# Patient Record
Sex: FEMALE | Race: WHITE | Employment: OTHER | URBAN - METROPOLITAN AREA
[De-identification: names, ages, dates, MRNs, and addresses within clinical notes are randomized per-mention and may not be internally consistent; named-entity substitution may affect disease eponyms.]

---

## 2024-06-21 ENCOUNTER — HOSPITAL ENCOUNTER (INPATIENT)
Age: 74
LOS: 3 days | Discharge: HOME | DRG: 034 | End: 2024-06-25
Attending: EMERGENCY MEDICINE | Admitting: INTERNAL MEDICINE
Payer: MEDICARE

## 2024-06-21 ENCOUNTER — APPOINTMENT (OUTPATIENT)
Dept: CT IMAGING | Age: 74
DRG: 034 | End: 2024-06-21
Payer: MEDICARE

## 2024-06-21 DIAGNOSIS — I63.9 ACUTE CVA (CEREBROVASCULAR ACCIDENT) (HCC): ICD-10-CM

## 2024-06-21 DIAGNOSIS — G51.0 FACIAL PALSY: ICD-10-CM

## 2024-06-21 DIAGNOSIS — R29.898 RIGHT ARM WEAKNESS: Primary | ICD-10-CM

## 2024-06-21 PROBLEM — R29.90 STROKE-LIKE SYMPTOMS: Status: ACTIVE | Noted: 2024-06-21

## 2024-06-21 LAB
ALBUMIN SERPL-MCNC: 4 GM/DL (ref 3.4–5)
ALP BLD-CCNC: 70 IU/L (ref 40–129)
ALT SERPL-CCNC: 7 U/L (ref 10–40)
ANION GAP SERPL CALCULATED.3IONS-SCNC: 17 MMOL/L (ref 7–16)
AST SERPL-CCNC: 21 IU/L (ref 15–37)
BASOPHILS ABSOLUTE: 0.1 K/CU MM
BASOPHILS RELATIVE PERCENT: 0.4 % (ref 0–1)
BILIRUB SERPL-MCNC: 0.5 MG/DL (ref 0–1)
BUN SERPL-MCNC: 14 MG/DL (ref 6–23)
CALCIUM SERPL-MCNC: 9.5 MG/DL (ref 8.3–10.6)
CHLORIDE BLD-SCNC: 97 MMOL/L (ref 99–110)
CO2: 23 MMOL/L (ref 21–32)
CREAT SERPL-MCNC: 0.8 MG/DL (ref 0.6–1.1)
DIFFERENTIAL TYPE: ABNORMAL
EOSINOPHILS ABSOLUTE: 0.1 K/CU MM
EOSINOPHILS RELATIVE PERCENT: 1 % (ref 0–3)
GFR, ESTIMATED: 77 ML/MIN/1.73M2
GLUCOSE BLD-MCNC: 144 MG/DL (ref 70–99)
GLUCOSE BLD-MCNC: 148 MG/DL (ref 70–99)
GLUCOSE SERPL-MCNC: 141 MG/DL (ref 70–99)
HCT VFR BLD CALC: 47.5 % (ref 37–47)
HEMOGLOBIN: 15.4 GM/DL (ref 12.5–16)
IMMATURE NEUTROPHIL %: 0.2 % (ref 0–0.43)
INR BLD: 1 INDEX
LYMPHOCYTES ABSOLUTE: 3.5 K/CU MM
LYMPHOCYTES RELATIVE PERCENT: 26.1 % (ref 24–44)
MCH RBC QN AUTO: 28.7 PG (ref 27–31)
MCHC RBC AUTO-ENTMCNC: 32.4 % (ref 32–36)
MCV RBC AUTO: 88.6 FL (ref 78–100)
MONOCYTES ABSOLUTE: 1 K/CU MM
MONOCYTES RELATIVE PERCENT: 7.4 % (ref 0–4)
NEUTROPHILS ABSOLUTE: 8.6 K/CU MM
NEUTROPHILS RELATIVE PERCENT: 64.9 % (ref 36–66)
NUCLEATED RBC %: 0 %
PDW BLD-RTO: 19.9 % (ref 11.7–14.9)
PLATELET # BLD: 256 K/CU MM (ref 140–440)
PMV BLD AUTO: 10.8 FL (ref 7.5–11.1)
POTASSIUM SERPL-SCNC: 3.4 MMOL/L (ref 3.5–5.1)
PROTHROMBIN TIME: 13.5 SECONDS (ref 11.7–14.5)
RBC # BLD: 5.36 M/CU MM (ref 4.2–5.4)
SODIUM BLD-SCNC: 137 MMOL/L (ref 135–145)
TOTAL IMMATURE NEUTOROPHIL: 0.03 K/CU MM
TOTAL NUCLEATED RBC: 0 K/CU MM
TOTAL PROTEIN: 7.3 GM/DL (ref 6.4–8.2)
TROPONIN, HIGH SENSITIVITY: 13 NG/L (ref 0–14)
WBC # BLD: 13.2 K/CU MM (ref 4–10.5)

## 2024-06-21 PROCEDURE — 82962 GLUCOSE BLOOD TEST: CPT

## 2024-06-21 PROCEDURE — 93005 ELECTROCARDIOGRAM TRACING: CPT | Performed by: NURSE PRACTITIONER

## 2024-06-21 PROCEDURE — 85025 COMPLETE CBC W/AUTO DIFF WBC: CPT

## 2024-06-21 PROCEDURE — 70450 CT HEAD/BRAIN W/O DYE: CPT

## 2024-06-21 PROCEDURE — G0378 HOSPITAL OBSERVATION PER HR: HCPCS

## 2024-06-21 PROCEDURE — 70498 CT ANGIOGRAPHY NECK: CPT

## 2024-06-21 PROCEDURE — 80053 COMPREHEN METABOLIC PANEL: CPT

## 2024-06-21 PROCEDURE — 99285 EMERGENCY DEPT VISIT HI MDM: CPT

## 2024-06-21 PROCEDURE — 85610 PROTHROMBIN TIME: CPT

## 2024-06-21 PROCEDURE — 6360000004 HC RX CONTRAST MEDICATION: Performed by: EMERGENCY MEDICINE

## 2024-06-21 PROCEDURE — 84484 ASSAY OF TROPONIN QUANT: CPT

## 2024-06-21 PROCEDURE — 6370000000 HC RX 637 (ALT 250 FOR IP): Performed by: EMERGENCY MEDICINE

## 2024-06-21 RX ORDER — ONDANSETRON 2 MG/ML
4 INJECTION INTRAMUSCULAR; INTRAVENOUS EVERY 6 HOURS PRN
Status: DISCONTINUED | OUTPATIENT
Start: 2024-06-21 | End: 2024-06-24 | Stop reason: SDUPTHER

## 2024-06-21 RX ORDER — ATORVASTATIN CALCIUM 40 MG/1
80 TABLET, FILM COATED ORAL NIGHTLY
Status: DISCONTINUED | OUTPATIENT
Start: 2024-06-21 | End: 2024-06-22

## 2024-06-21 RX ORDER — ASPIRIN 81 MG/1
324 TABLET, CHEWABLE ORAL ONCE
Status: COMPLETED | OUTPATIENT
Start: 2024-06-21 | End: 2024-06-21

## 2024-06-21 RX ORDER — LABETALOL HYDROCHLORIDE 5 MG/ML
10 INJECTION INTRAVENOUS EVERY 10 MIN PRN
Status: DISCONTINUED | OUTPATIENT
Start: 2024-06-21 | End: 2024-06-23

## 2024-06-21 RX ORDER — OMEPRAZOLE 20 MG/1
20 CAPSULE, DELAYED RELEASE ORAL DAILY
COMMUNITY

## 2024-06-21 RX ORDER — MECLIZINE HCL 25MG 25 MG/1
25 TABLET, CHEWABLE ORAL AS NEEDED
COMMUNITY

## 2024-06-21 RX ORDER — LISINOPRIL AND HYDROCHLOROTHIAZIDE 20; 12.5 MG/1; MG/1
1 TABLET ORAL DAILY
Status: ON HOLD | COMMUNITY
End: 2024-06-25 | Stop reason: HOSPADM

## 2024-06-21 RX ORDER — ROSUVASTATIN CALCIUM 20 MG/1
20 TABLET, COATED ORAL NIGHTLY
COMMUNITY

## 2024-06-21 RX ORDER — LEVOTHYROXINE SODIUM 0.03 MG/1
25 TABLET ORAL DAILY
COMMUNITY

## 2024-06-21 RX ORDER — AMLODIPINE BESYLATE 10 MG/1
10 TABLET ORAL DAILY
COMMUNITY

## 2024-06-21 RX ORDER — ONDANSETRON 4 MG/1
4 TABLET, ORALLY DISINTEGRATING ORAL EVERY 8 HOURS PRN
Status: DISCONTINUED | OUTPATIENT
Start: 2024-06-21 | End: 2024-06-24 | Stop reason: SDUPTHER

## 2024-06-21 RX ORDER — ENOXAPARIN SODIUM 100 MG/ML
40 INJECTION SUBCUTANEOUS DAILY
Status: DISCONTINUED | OUTPATIENT
Start: 2024-06-22 | End: 2024-06-25 | Stop reason: HOSPADM

## 2024-06-21 RX ORDER — METOPROLOL TARTRATE 50 MG/1
50 TABLET, FILM COATED ORAL 2 TIMES DAILY
Status: ON HOLD | COMMUNITY
End: 2024-06-25 | Stop reason: HOSPADM

## 2024-06-21 RX ORDER — ASPIRIN 81 MG/1
81 TABLET, CHEWABLE ORAL DAILY
Status: DISCONTINUED | OUTPATIENT
Start: 2024-06-22 | End: 2024-06-25 | Stop reason: HOSPADM

## 2024-06-21 RX ORDER — ROPINIROLE 1 MG/1
1 TABLET, FILM COATED ORAL 3 TIMES DAILY
COMMUNITY

## 2024-06-21 RX ORDER — ASPIRIN 300 MG/1
300 SUPPOSITORY RECTAL DAILY
Status: DISCONTINUED | OUTPATIENT
Start: 2024-06-22 | End: 2024-06-21

## 2024-06-21 RX ADMIN — IOPAMIDOL 75 ML: 755 INJECTION, SOLUTION INTRAVENOUS at 20:56

## 2024-06-21 RX ADMIN — ASPIRIN 324 MG: 81 TABLET, CHEWABLE ORAL at 21:44

## 2024-06-21 ASSESSMENT — PAIN SCALES - GENERAL
PAINLEVEL_OUTOF10: 0
PAINLEVEL_OUTOF10: 0

## 2024-06-21 ASSESSMENT — PAIN - FUNCTIONAL ASSESSMENT
PAIN_FUNCTIONAL_ASSESSMENT: 0-10
PAIN_FUNCTIONAL_ASSESSMENT: NONE - DENIES PAIN

## 2024-06-22 ENCOUNTER — APPOINTMENT (OUTPATIENT)
Dept: MRI IMAGING | Age: 74
DRG: 034 | End: 2024-06-22
Payer: MEDICARE

## 2024-06-22 PROBLEM — I63.9 ACUTE CVA (CEREBROVASCULAR ACCIDENT) (HCC): Status: ACTIVE | Noted: 2024-06-22

## 2024-06-22 LAB
ANION GAP SERPL CALCULATED.3IONS-SCNC: 14 MMOL/L (ref 7–16)
BUN SERPL-MCNC: 11 MG/DL (ref 6–23)
CALCIUM SERPL-MCNC: 9 MG/DL (ref 8.3–10.6)
CHLORIDE BLD-SCNC: 102 MMOL/L (ref 99–110)
CHOLEST SERPL-MCNC: 108 MG/DL
CO2: 26 MMOL/L (ref 21–32)
CREAT SERPL-MCNC: 0.6 MG/DL (ref 0.6–1.1)
ESTIMATED AVERAGE GLUCOSE: 131 MG/DL
GFR, ESTIMATED: >90 ML/MIN/1.73M2
GLUCOSE BLD-MCNC: 123 MG/DL (ref 70–99)
GLUCOSE SERPL-MCNC: 103 MG/DL (ref 70–99)
HBA1C MFR BLD: 6.2 % (ref 4.2–6.3)
HCT VFR BLD CALC: 43.9 % (ref 37–47)
HDLC SERPL-MCNC: 31 MG/DL
HEMOGLOBIN: 14 GM/DL (ref 12.5–16)
LDLC SERPL CALC-MCNC: 44 MG/DL
MAGNESIUM: 2.1 MG/DL (ref 1.8–2.4)
MCH RBC QN AUTO: 28.2 PG (ref 27–31)
MCHC RBC AUTO-ENTMCNC: 31.9 % (ref 32–36)
MCV RBC AUTO: 88.5 FL (ref 78–100)
PDW BLD-RTO: 19.4 % (ref 11.7–14.9)
PLATELET # BLD: 231 K/CU MM (ref 140–440)
PMV BLD AUTO: 11 FL (ref 7.5–11.1)
POTASSIUM SERPL-SCNC: 3.3 MMOL/L (ref 3.5–5.1)
RBC # BLD: 4.96 M/CU MM (ref 4.2–5.4)
SODIUM BLD-SCNC: 142 MMOL/L (ref 135–145)
TRIGL SERPL-MCNC: 165 MG/DL
WBC # BLD: 7.1 K/CU MM (ref 4–10.5)

## 2024-06-22 PROCEDURE — 6360000002 HC RX W HCPCS: Performed by: STUDENT IN AN ORGANIZED HEALTH CARE EDUCATION/TRAINING PROGRAM

## 2024-06-22 PROCEDURE — 36415 COLL VENOUS BLD VENIPUNCTURE: CPT

## 2024-06-22 PROCEDURE — 94761 N-INVAS EAR/PLS OXIMETRY MLT: CPT

## 2024-06-22 PROCEDURE — 82962 GLUCOSE BLOOD TEST: CPT

## 2024-06-22 PROCEDURE — 1200000000 HC SEMI PRIVATE

## 2024-06-22 PROCEDURE — 70551 MRI BRAIN STEM W/O DYE: CPT

## 2024-06-22 PROCEDURE — 6370000000 HC RX 637 (ALT 250 FOR IP): Performed by: NURSE PRACTITIONER

## 2024-06-22 PROCEDURE — G0378 HOSPITAL OBSERVATION PER HR: HCPCS

## 2024-06-22 PROCEDURE — 80061 LIPID PANEL: CPT

## 2024-06-22 PROCEDURE — 93005 ELECTROCARDIOGRAM TRACING: CPT | Performed by: STUDENT IN AN ORGANIZED HEALTH CARE EDUCATION/TRAINING PROGRAM

## 2024-06-22 PROCEDURE — 83735 ASSAY OF MAGNESIUM: CPT

## 2024-06-22 PROCEDURE — 80048 BASIC METABOLIC PNL TOTAL CA: CPT

## 2024-06-22 PROCEDURE — 85027 COMPLETE CBC AUTOMATED: CPT

## 2024-06-22 PROCEDURE — 97161 PT EVAL LOW COMPLEX 20 MIN: CPT

## 2024-06-22 PROCEDURE — 83036 HEMOGLOBIN GLYCOSYLATED A1C: CPT

## 2024-06-22 PROCEDURE — 6370000000 HC RX 637 (ALT 250 FOR IP): Performed by: STUDENT IN AN ORGANIZED HEALTH CARE EDUCATION/TRAINING PROGRAM

## 2024-06-22 PROCEDURE — 96372 THER/PROPH/DIAG INJ SC/IM: CPT

## 2024-06-22 PROCEDURE — 99223 1ST HOSP IP/OBS HIGH 75: CPT | Performed by: STUDENT IN AN ORGANIZED HEALTH CARE EDUCATION/TRAINING PROGRAM

## 2024-06-22 PROCEDURE — 97165 OT EVAL LOW COMPLEX 30 MIN: CPT

## 2024-06-22 RX ORDER — LEVOTHYROXINE SODIUM 0.03 MG/1
25 TABLET ORAL DAILY
Status: DISCONTINUED | OUTPATIENT
Start: 2024-06-22 | End: 2024-06-25 | Stop reason: HOSPADM

## 2024-06-22 RX ORDER — LANOLIN ALCOHOL/MO/W.PET/CERES
3 CREAM (GRAM) TOPICAL NIGHTLY PRN
Status: DISCONTINUED | OUTPATIENT
Start: 2024-06-22 | End: 2024-06-25 | Stop reason: HOSPADM

## 2024-06-22 RX ORDER — POLYETHYLENE GLYCOL 3350 17 G/17G
17 POWDER, FOR SOLUTION ORAL DAILY
Status: DISCONTINUED | OUTPATIENT
Start: 2024-06-22 | End: 2024-06-25 | Stop reason: HOSPADM

## 2024-06-22 RX ORDER — HYDROXYZINE 50 MG/1
25 TABLET, FILM COATED ORAL ONCE
Status: COMPLETED | OUTPATIENT
Start: 2024-06-22 | End: 2024-06-22

## 2024-06-22 RX ORDER — LISINOPRIL AND HYDROCHLOROTHIAZIDE 20; 12.5 MG/1; MG/1
1 TABLET ORAL DAILY
Status: DISCONTINUED | OUTPATIENT
Start: 2024-06-22 | End: 2024-06-25 | Stop reason: HOSPADM

## 2024-06-22 RX ORDER — SENNA AND DOCUSATE SODIUM 50; 8.6 MG/1; MG/1
2 TABLET, FILM COATED ORAL DAILY PRN
Status: DISCONTINUED | OUTPATIENT
Start: 2024-06-22 | End: 2024-06-25 | Stop reason: HOSPADM

## 2024-06-22 RX ORDER — METOPROLOL TARTRATE 50 MG/1
50 TABLET, FILM COATED ORAL 2 TIMES DAILY
Status: DISCONTINUED | OUTPATIENT
Start: 2024-06-22 | End: 2024-06-25 | Stop reason: HOSPADM

## 2024-06-22 RX ORDER — ROPINIROLE 1 MG/1
1 TABLET, FILM COATED ORAL 3 TIMES DAILY
Status: DISCONTINUED | OUTPATIENT
Start: 2024-06-22 | End: 2024-06-25 | Stop reason: HOSPADM

## 2024-06-22 RX ORDER — AMLODIPINE BESYLATE 10 MG/1
10 TABLET ORAL DAILY
Status: DISCONTINUED | OUTPATIENT
Start: 2024-06-22 | End: 2024-06-25 | Stop reason: HOSPADM

## 2024-06-22 RX ORDER — CLOPIDOGREL BISULFATE 75 MG/1
300 TABLET ORAL ONCE
Status: COMPLETED | OUTPATIENT
Start: 2024-06-22 | End: 2024-06-22

## 2024-06-22 RX ORDER — PANTOPRAZOLE SODIUM 20 MG/1
20 TABLET, DELAYED RELEASE ORAL
Status: DISCONTINUED | OUTPATIENT
Start: 2024-06-22 | End: 2024-06-25 | Stop reason: HOSPADM

## 2024-06-22 RX ORDER — ROSUVASTATIN CALCIUM 20 MG/1
20 TABLET, COATED ORAL NIGHTLY
Status: DISCONTINUED | OUTPATIENT
Start: 2024-06-22 | End: 2024-06-25 | Stop reason: HOSPADM

## 2024-06-22 RX ORDER — CLOPIDOGREL BISULFATE 75 MG/1
75 TABLET ORAL DAILY
Status: DISCONTINUED | OUTPATIENT
Start: 2024-06-23 | End: 2024-06-25 | Stop reason: HOSPADM

## 2024-06-22 RX ORDER — POTASSIUM CHLORIDE 20 MEQ/1
40 TABLET, EXTENDED RELEASE ORAL ONCE
Status: COMPLETED | OUTPATIENT
Start: 2024-06-22 | End: 2024-06-22

## 2024-06-22 RX ADMIN — LEVOTHYROXINE SODIUM 25 MCG: 25 TABLET ORAL at 06:12

## 2024-06-22 RX ADMIN — ROSUVASTATIN CALCIUM 20 MG: 20 TABLET, COATED ORAL at 22:10

## 2024-06-22 RX ADMIN — PANTOPRAZOLE SODIUM 20 MG: 20 TABLET, DELAYED RELEASE ORAL at 06:12

## 2024-06-22 RX ADMIN — ROPINIROLE HYDROCHLORIDE 1 MG: 1 TABLET, FILM COATED ORAL at 14:56

## 2024-06-22 RX ADMIN — ATORVASTATIN CALCIUM 80 MG: 40 TABLET, FILM COATED ORAL at 00:42

## 2024-06-22 RX ADMIN — CLOPIDOGREL BISULFATE 300 MG: 75 TABLET ORAL at 14:56

## 2024-06-22 RX ADMIN — SENNOSIDES AND DOCUSATE SODIUM 2 TABLET: 50; 8.6 TABLET ORAL at 17:19

## 2024-06-22 RX ADMIN — ASPIRIN 81 MG: 81 TABLET, CHEWABLE ORAL at 08:09

## 2024-06-22 RX ADMIN — ENOXAPARIN SODIUM 40 MG: 100 INJECTION SUBCUTANEOUS at 08:09

## 2024-06-22 RX ADMIN — HYDROXYZINE HYDROCHLORIDE 25 MG: 50 TABLET, FILM COATED ORAL at 22:10

## 2024-06-22 RX ADMIN — ROPINIROLE HYDROCHLORIDE 1 MG: 1 TABLET, FILM COATED ORAL at 22:10

## 2024-06-22 RX ADMIN — POTASSIUM CHLORIDE 40 MEQ: 1500 TABLET, EXTENDED RELEASE ORAL at 09:16

## 2024-06-22 RX ADMIN — ROPINIROLE HYDROCHLORIDE 1 MG: 1 TABLET, FILM COATED ORAL at 08:09

## 2024-06-22 NOTE — ED PROVIDER NOTES
HPI: In brief, for evaluation of right-sided weakness slurred speech and mild aphasia that started suddenly.  The patient reports that her last known well was around 4 PM.  No prior episodes of any similar.  Feels as though symptoms are rapidly improving.    Focused exam revealed very slight drift of the right upper extremity and some slight dysarthria and mild facial droop.  NIHSS of 3.  The patient had reassuring vitals here.  Appears to be neurovascularly otherwise intact.  The patient has a regular rhythm and normal rate.  Abdomen is soft nontender nondistended without rebound or guarding.  Good distal pulses.  No overlying skin changes.    ED course/MDM: Concern to be for acute stroke.  Stroke alert was activated.  The patient did not meet criteria for thrombolytics.  She did have abnormalities on CTA so I discussed the case with neurointerventional.  Plan will be to hospitalize him for further care based off conversation with neurology and neurointerventional, Dr. Garcia.  Discussed with HM team as well for hospitalization.  Patient was updated on plan of care and also agreeable.    Diagnostics: I independently interpreted the following study(s): CT head which shows no acute intracranial hemorrhage.  Consultants: I personally discussed the patient's management with Dr. Garcia who stated he will evaluate the patient in the morning with further recommendations to come.  Critical Care: I personally saw the patient and independently provided 33 minutes of non-concurrent critical care of the total shared critical care time provided excluding separately billable procedures.  Procedures: 12 lead EKG per my interpretation:  Normal Sinus Rhythm  Rate: 81  QTc is normal  There are nonspecific T wave changes.  There are are no ST concerning segment changes.    Prior EKG to compare with was not available    (All EKG's are interpreted by myself in the absence of a cardiologist)    I personally saw the patient and

## 2024-06-22 NOTE — CONSULTS
Neurology Service Consult Note  [facility]   Patient Name: Kiersten Bennett  : 1950        Subjective:   Reason for consult: Stroke like symptoms  74 y.o. female with history of HTN, HLD, hypothyroidism, GERD, RLS, right bell's palsy presenting to [facility] with complaints of right upper extremity and facial numbness. Stroke alert was called on arrival, initial NIH 0. Intervention was not recommended. CTA head and neck noted severe left ICA stenosis and severe left M2 focal stenosis. Patient was evaluated by Dr. Garcia Neuro intervention, will likely need angiogram and carotid stent, timing for this intervention pending MRI completion.    Chart was reviewed in detail, patient was seen and assessed.  Patient is resting in bed,  is at bedside.  She describes 2 separate events yesterday.  First fall at [facility] she was attempting to request creamer for coffee and while at the counter was unable to express what she wanted to say.  Has been tells me that she continues to have difficulty with speech following this event, felt symptoms persisted for 1 to 2 hours.  Upon returning home patient laid down to take a nap and upon waking noted that she was not able to control her right hand.  Symptoms persisted until shortly after she arrived at the ED and then have fully resolved.  Looking at timing this was approximately 2 hours.  Patient has history of Bell's palsy with mild residual right facial weakness.  She does not feel this is any different than her baseline.   confirms that as well.  Patient does take rosuvastatin at home without any missed doses.  She does not consistently take antiplatelet medication.  Patient does have a history of current tobacco use and cessation was recommended.  Patient lives both in [place] and [place], currently is here for an extended period of time.  Her primary care doctor is in [place].      History reviewed. No pertinent past  changes, inability to express self although knew what she wanted to say.  This lasted for about 2 hours.  Second event was right upper extremity numbness, weakness and sensation that she was unable to control her right hand.  This also lasted for about 2 hours.  Today patient feels back to baseline.  Does have mild residual right facial weakness secondary to history of Bell's palsy.  Otherwise no focal findings on exam.  Speech is clear, language is fluent.  Episodes of aphasia, right upper extremity numbness and weakness secondary to acute stroke superimposed on symptomatic left ICA stenosis  Neuroimaging as above  CT head with no acute findings but does note multiple sclerotic lesions throughout the visualized cranium.  Would consider CT chest, abdomen, pelvis to rule out any underlying malignancy.  Patient does have history of tobacco use, currently smokes  CTA head and neck with focal severe stenosis/occlusion of the left M2 segment MCA and severe left ICA stenosis  From neurology standpoint recommend DAPT, statin for secondary stroke prevention.  Will defer timing on when to initiate Plavix to Dr. Garcia with neurointervention pending possible upcoming angio. For now continue aspirin and Crestor  Will discontinue atorvastatin 80 mg and restart patient's home dose Crestor 20 mg  LDL 44, A1c 6.2  Would avoid hypotension to ensure patient is well-perfused.  SBP goal 120-140  Antihypertensives currently on hold per IM  Neurointervention following, possible angiogram upcoming  PT/OT/ST as recommended  Will continue to follow loosely pending clinical course        Thank you for allowing us to participate in the care of your patient.  If there are any questions regarding evaluation please feel free to contact us.     Mariaa Almaraz, MEGAN - CNP, 6/22/2024   Attending Addendum:    I have discussed this patient with the mid-level provider.  I have personally seen and examined the patient and reviewed the diagnostic testing

## 2024-06-22 NOTE — ED PROVIDER NOTES
[facility] EMERGENCY DEPARTMENT  EMERGENCY DEPARTMENT ENCOUNTER      Pt Name: Kiersten Bennett  MRN: [id number]  Birthdate 1950  Date of evaluation: 6/21/2024  Provider: MEGAN Badillo CNP  PCP: No primary care provider on file.  Note Started: 8:38 PM EDT 6/21/24    I am the Primary Clinician of Record.   I have seen and evaluated this patient with my supervising physician Simeon Chadwick MD.    CHIEF COMPLAINT       Chief Complaint   Patient presents with    Facial Droop    Extremity Weakness     Right arm       HISTORY OF PRESENT ILLNESS: 1 or more Elements     History from : Patient and Family  and sister    Limitations to history : none    Kiersten Bennett is a 74 y.o. female who presents to the emergency department with right arm weakness and facial droop.  She states that she was at [email or web address] around 4:00 to get her  some coffee.  She noticed while she was ordering the her speech was slurred.  She states that she also had a hard time gripping the coffee.  When she got home she tried to get a cigarette out and could not hold a cigarette.  She laid down with her  for a while.  Her sister got home around 6:00 PM and noticed that she had right sided facial drooping.  Last known well was just before 4 PM.  Patient denies previous history of stroke.  No chest pain, no shortness of breath, no vision disturbances.  No headache.    Nursing Notes were all reviewed and agreed with or any disagreements were addressed in the HPI.    REVIEW OF SYSTEMS :      Review of Systems   Constitutional:  Negative for fatigue and fever.   HENT:  Positive for drooling (Right-sided mouth per ). Negative for facial swelling and trouble swallowing.    Eyes:  Negative for photophobia and visual disturbance.   Respiratory:  Negative for chest tightness and shortness of breath.    Gastrointestinal:  Negative for abdominal pain, nausea and vomiting.

## 2024-06-22 NOTE — PROGRESS NOTES
Neurointervention    MRI reviewed which showed small embolic infarct in the left middle cerebral artery territory consistent with her known high-grade stenosis of the left internal carotid artery and left intracranial middle cerebral artery.    Discussed with patient angiogram and possible carotid artery stent.  Risk and benefits discussed.    She is agreeable to the procedure, we will do this Monday afternoon around 1 PM.

## 2024-06-22 NOTE — CONSULTS
[facility] ACUTE CARE PHYSICAL THERAPY EVALUATION  Kiersten Bennett, 1950, OBS 02/[id number], 6/22/2024    History  [place]:  The primary encounter diagnosis was Right arm weakness. Diagnoses of Facial palsy and Acute CVA (cerebrovascular accident) (HCC) were also pertinent to this visit.  Patient  has no past medical history on file.  Patient  has no past surgical history on file.    Discharge Recommendation: no needs    Equipment: none    Subjective:    Patient states:  \"Do you know anything that will help restless leg syndrome?\"      Pain:  denies pain.      Communication with other providers:  Handoff to RN, OT    Restrictions: general precautions    Home Setup/Prior level of function  Social/Functional History  Lives With: Spouse (and sister)  Type of Home: House  Home Layout: One level, Laundry in basement, Able to Live on Main level with bedroom/bathroom  Home Access: Ramped entrance  Has the patient had two or more falls in the past year or any fall with injury in the past year?: Yes  ADL Assistance: Independent  Homemaking Assistance: Independent  Ambulation Assistance: Independent  Transfer Assistance: Independent  Active : Yes    Examination of body systems (includes body structures/functions, activity/participation limitations):  Observation:  pt supine in bed with spouse present upon arrival and agreeable to therapy  Vision:  Wears glasses  Hearing:  WFL  Cardiopulmonary:  no O2 needs  Cognition: WFL, see OT/SLP note for further evaluation.    Musculoskeletal  ROM R/L:  WFL.    Strength R/L:  5/5, no impairment in function and endurance.    Neuro:  WFL, pt back to baseline      Mobility:  Rolling L/R:  independent  Supine <-> sit:  independent  Transfers: pt completed STS to/from EOB independently  Sitting balance:  good.    Standing balance:  good.    Gait: pt ambulated 200' without a device SBA with good tran and no LOB. Cues provided for pathway    [facility] 6 Clicks Inpatient

## 2024-06-22 NOTE — PROGRESS NOTES
Neurointervention     Spoke with ED physician Dr Chadwick.    Patient with improved symptoms currently NIHSS 1 for r arm drift.  Not a candidate for acute intervention.      CTA reviewed left ICA severe stenosis and left M2 severe focal stenosis.Will likely need angiogram/carotid stent to evaluate and treat the carotid artery and evaluate mca stenosis.      Recommend MRI, MRI results will determine timing of angiogram/stent< BR>  Recommend aspirin for now, will  addiPlavix after mri if stroke is not large (which it is likely small based on exam) and in preparation for intervention.

## 2024-06-22 NOTE — PROGRESS NOTES
Occupational Therapy  [facility] ACUTE CARE OCCUPATIONAL THERAPY EVALUATION    Kiersten Bennett, 1950, OBS 02/[id number], 6/22/2024    Discharge Recommendation: home w/ assist prn      History:  [place]:  The primary encounter diagnosis was Right arm weakness. Diagnoses of Facial palsy and Acute CVA (cerebrovascular accident) (HCC) were also pertinent to this visit.  History reviewed. No pertinent past medical history.      Subjective:  Patient states: \"I feel fine now\"  Pain:  denies  Communication with other providers: co-eval w/ PT, handoff to RN  Restrictions: General Precautions, Fall Risk    Home Setup/Prior level of function:  Social/Functional History  Lives With: Spouse (and sister)  Type of Home: House  Home Layout: One level, Laundry in basement, Able to Live on Main level with bedroom/bathroom  Home Access: Ramped entrance  Has the patient had two or more falls in the past year or any fall with injury in the past year?: Yes  ADL Assistance: Independent  Homemaking Assistance: Independent  Ambulation Assistance: Independent  Transfer Assistance: Independent  Active : Yes     Examination:  Observation: Supine in bed upon arrival, agreeable to therapy eval.  Vision: reports macular degeneration  Hearing: WFL  Vitals: Stable vitals throughout session on room air      Body Systems and functions:  ROM: WFL   Strength: B UE grossly 4+/5 across all major joints   Sensation: WFL  Tone: Normal  Coordination: WFL  Perception: WNL      Cognitive and Psychosocial Functioning:  Overall cognitive status: alert and oriented, WFL  Affect: Normal       Functional Mobility:  Bed mobility:  IND  Sitting balance:  IND    Transfers: STS to/from EOB w/ IND  Standing balance:  IND w/o AD  Functional Mobility: ambulated functional household distance w/o AD w/ SBA, Vcs for pathway  Toilet/Shower Transfers: NT        Activities of Daily Living (ADLs):  Feeding: set up A  Grooming: supervision  UB bathing:  supervision  LB bathing: supervision  UB dressing: supervision  LB dressing: supervision  Toileting: supervision    *ADL determined per observation of functional mobility, balance, activity tolerance, cognition, or actual ADL performance.     AM-PAC 6 click short form for inpatient daily activity:   How much help from another person does the patient currently need... Unable  Dep A Lot  Max A A Lot   Mod A A Little  Min A A Little   CGA  SBA None   Mod I  Indep  Sup   1.  Putting on and taking off regular lower body clothing? [] 1    [] 2   [] 2   [] 3   [] 3   [x] 4      2. Bathing (including washing, rinsing, drying)? [] 1   [] 2   [] 2 [] 3 [] 3 [x] 4   3. Toileting, which includes using toilet, bedpan, or urinal? [] 1    [] 2   [] 2   [] 3   [] 3   [x] 4     4. Putting on and taking off regular upper body clothing? [] 1   [] 2   [] 2   [] 3   [] 3    [x] 4      5. Taking care of personal grooming such as brushing teeth? [] 1   [] 2    [] 2 [] 3    [] 3   [x] 4      6. Eating meals?   [] 1   [] 2   [] 2   [] 3   [] 3   [x] 4        Raw Score:  24     [24=0% impaired(CH), 23=1-19%(CI), 20-22=20-39%(CJ), 15-19=40-59%(CK), 10-14=60-79%(CL), 7-9=80-99%(CM), 6=100%(CN)]       Educated pt on role of OT, therapy POC and functional goals, progression w/ ADLs and transfers, importance of movement and OOB activity, d/c recommendations     Safety Measures: Gait belt used, Left in bed, Alarm in place  Recommendations for NURSING activity:  Up to chair for all 3 meals and up to bathroom for all toileting needs     Assessment:  Pt is a 74 y o F admitted d/t stroke like symptoms. Pt at baseline is IND for ADLs, IND for high level IADLs, and IND for functional transfers/mobility w/o AD. Pt currently presents at/near baseline w/ ADLs and mobility, no further acute  OT needs.     Complexity: Low  Prognosis: Good, no significant barriers to participation at this time.   Occupational Therapy Plan  Times Per Week: d/c       Time:

## 2024-06-22 NOTE — PLAN OF CARE
Problem: Discharge Planning  Goal: Discharge to home or other facility with appropriate resources  6/22/2024 1012 by Yenny Carvalho, RN  Outcome: Progressing  Flowsheets (Taken 6/22/2024 0017 by Dom Zhang, RN)  Discharge to home or other facility with appropriate resources:   Identify barriers to discharge with patient and caregiver   Arrange for needed discharge resources and transportation as appropriate   Identify discharge learning needs (meds, wound care, etc)   Refer to discharge planning if patient needs post-hospital services based on physician order or complex needs related to functional status, cognitive ability or social support system  6/21/2024 2337 by Dom Zhang, RN  Outcome: Progressing     Problem: Pain  Goal: Verbalizes/displays adequate comfort level or baseline comfort level  6/22/2024 1012 by Yenny Carvalho, RN  Outcome: Progressing  6/21/2024 2337 by Dom Zhang, RN  Outcome: Progressing

## 2024-06-22 NOTE — ED NOTES
ED TO INPATIENT SBAR HANDOFF    Patient Name: Kiersten Bennett   :  1950  74 y.o.   Preferred Name  Kiersten  Family/Caregiver Present yes   Restraints no   C-SSRS: Risk of Suicide: No Risk  Sitter no   Sepsis Risk Score        Situation  Chief Complaint   Patient presents with    Facial Droop    Extremity Weakness     Right arm     Brief Description of Patient's Condition: Patient presents to the ED from home with complaints of right arm weakness and facial droop. LKW was 6pm. Stroke alert called on patient. Patient able to ambulate to the bathroom without assistance.   Mental Status: oriented and alert  Arrived from: home    Imaging:   CT HEAD WO CONTRAST   Final Result   As above.      Electronically signed by Jorge Luis Kramer      CTA HEAD NECK W CONTRAST   Final Result      1.  Focal severe stenosis/occlusion of the left M2 segment with distal   reconstitution of the remainder of the M2 and M3 segments.   2.  Severe calcified and noncalcified plaque at the left bifurcation and   cervical ICA with up to 90% stenosis by NASCET criteria.   3.  Mild atherosclerotic calcification of the right bifurcation with up to 20%   stenosis by NASCET criteria.   4.  Scattered atherosclerotic calcification otherwise as described above.   5.  Severe emphysema within the imaged lung apices.      COMMUNICATION:  Communicated with: Dr. Chadwick on  2024 at 9:45 p.m.      Electronically signed by Martir De La Garza        Abnormal labs:   Abnormal Labs Reviewed   CBC WITH AUTO DIFFERENTIAL - Abnormal; Notable for the following components:       Result Value    WBC 13.2 (*)     Hematocrit 47.5 (*)     RDW 19.9 (*)     Monocytes % 7.4 (*)     All other components within normal limits   COMPREHENSIVE METABOLIC PANEL - Abnormal; Notable for the following components:    Potassium 3.4 (*)     Chloride 97 (*)     Anion Gap 17 (*)     Glucose 141 (*)     ALT 7 (*)     All other components within normal limits   POCT GLUCOSE - Abnormal;  Notable for the following components:    POC Glucose 144 (*)     All other components within normal limits   POCT GLUCOSE - Abnormal; Notable for the following components:    POC Glucose 148 (*)     All other components within normal limits        Background  History: History reviewed. No pertinent past medical history.    Assessment    Vitals:    Level of Consciousness: Alert (0)   Vitals:    06/21/24 2018 06/21/24 2102 06/21/24 2202   BP: (!) 153/61 138/62 (!) 127/57   Pulse: (!) 108 98 84   Resp:  16 14   Temp:  98.7 °F (37.1 °C)    TempSrc:  Oral    SpO2: 91% 95% 92%   Weight: 71.2 kg (157 lb)     Height: 1.6 m (5' 3\")       PO Status: Nothing by Mouth  O2 Flow Rate: O2 Device: None (Room air)    Cardiac Rhythm: NS  Last documented pain medication administered: none  NIH Score: NIH NIH Stroke Scale  Interval: Reassessment  Level of Consciousness (1a): Alert  LOC Questions (1b): Answers both correctly  LOC Commands (1c): Performs both tasks correctly  Best Gaze (2): Normal  Visual (3): No visual loss  Facial Palsy (4): (!) Minor paralysis  Motor Arm, Left (5a): No drift  Motor Arm, Right (5b): No drift  Motor Leg, Left (6a): No drift  Motor Leg, Right (6b): No drift  Limb Ataxia (7): Absent  Sensory (8): Normal  Best Language (9): No aphasia  Dysarthria (10): Normal  Extinction and Inattention (11): No abnormality  Total: 1   Active LDA's:   Peripheral IV 06/21/24 Left Antecubital (Active)       Pertinent or High Risk Medications/Drips: no   If Yes, please provide details: none  Blood Product Administration: no  If Yes, please provide details: none    Recommendation    Incomplete orders: Admission orders   Additional Comments: none   If any further questions, please call Sending RN at [phone]    Electronically signed by: Electronically signed by Tanja Gonzalez RN on 6/21/2024 at 10:05 PM

## 2024-06-22 NOTE — H&P
History and Physical      Name:  Kiersten Bennett /Age/Sex: 1950  (74 y.o. female)   MRN & CSN:  [id number] & [id number] Encounter Date/Time:2024  10:10 PM EDT   Location:  OBS  PCP: No primary care provider on file.       Assessment and Plan:   Kiersten Bennett is a 74 y.o. female with hypertension, hyperlipidemia, hypothyroidism, GERD, RLS, history of right-sided Bell's palsy presented from home with right upper extremity and facial numbness    Strokelike symptoms  LKN 1600 NIH on arrival 0 CT brain and CTA head and neck reviewed no acute hemorrhage or mass effect, left ICA 90% stenosis, multifocal stenosis left M2 and M3  Aspirin load, aspirin Lipitor  NIHSS and neurochecks per stroke protocol, MRI brain in a.m.  Discussed with vascular neurology, plan for intervention depending on MRI brain  PT OT evaluation    Hypertension  Hold all home medications due to permissive hypertension    Hypothyroidism  Continue home Synthroid    GERD  Continue home Protonix    RLS  Continue home Requip    Tobacco use  Refusing NRT, counseled on cessation    Observation MedSurg telemetry  Full code    Disposition:     Current Living situation: Home  Expected Disposition: Home  Estimated D/C: 2-3 days    Diet Diet NPO   DVT Prophylaxis [x] Lovenox, []  Heparin, [] SCDs, [] Ambulation,  [] Eliquis, [] Xarelto, [] Coumadin   Code Status Full Code   Surrogate Decision Maker/ KYLIE Narayan     Personally reviewed Lab Studies and Imaging   EKG interpreted personally and results NSR 81/min no acute ST-T wave abnormalities  Discussed management of the case with ER provider who recommended admission due to stroke rule out      History from:     Patient     History of Present Illness:     Chief Complaint   Patient presents with   • Facial Droop   • Extremity Weakness     Right arm     Kiersten Bennett is a 74 y.o. female with hypertension, hyperlipidemia, hypothyroidism, GERD, RLS, history of right-sided Bell's palsy  (ANTIVERT) 25 MG CHEW Take 1 tablet by mouth as needed (1 time daily)   Yes Provider, Historical, MD       Physical Exam:      General: NAD  Eyes: No scleral icterus or jaundice  ENT: neck supple  Cardiovascular: Regular rate.  Respiratory: Clear to auscultation  Gastrointestinal: Soft, non tender  Genitourinary: no suprapubic tenderness  Musculoskeletal: No edema  Skin: warm, dry  Neuro: Alert oriented x 3, mild right-sided facial droop-chronic per patient, NIH 0  Psych: Mood appropriate.     Past Medical History:     PMHx hypertension hyperlipidemia hypothyroidism GERD  PSHX: No pertinent surgical history  Allergies: No Known Allergies  Fam HX: Hypertension, CAD  Soc HX:   Social History     Socioeconomic History   • Marital status:      Spouse name: None   • Number of children: None   • Years of education: None   • Highest education level: None   Tobacco Use   • Smoking status: Every Day     Current packs/day: 2.00     Types: Cigarettes   • Smokeless tobacco: Never   Substance and Sexual Activity   • Alcohol use: Never   • Drug use: Never     Social Determinants of Health     Food Insecurity: No Food Insecurity (6/22/2024)    Hunger Vital Sign    • Worried About Running Out of Food in the Last Year: Never true    • Ran Out of Food in the Last Year: Never true   Transportation Needs: No Transportation Needs (6/22/2024)    PRAPARE - Transportation    • Lack of Transportation (Medical): No    • Lack of Transportation (Non-Medical): No   Housing Stability: Low Risk  (6/22/2024)    Housing Stability Vital Sign    • Unable to Pay for Housing in the Last Year: No    • Number of Places Lived in the Last Year: 1    • Unstable Housing in the Last Year: No       Medications:   Medications:   • [Held by provider] amLODIPine  10 mg Oral Daily   • levothyroxine  25 mcg Oral Daily   • [Held by provider] lisinopril-hydroCHLOROthiazide  1 tablet Oral Daily   • [Held by provider] metoprolol tartrate  50 mg Oral BID   •

## 2024-06-22 NOTE — PROGRESS NOTES
left M2 segment with distal reconstitution of the remainder of the M2 and M3 segments. 2.  Severe calcified and noncalcified plaque at the left bifurcation and cervical ICA with up to 90% stenosis by NASCET criteria. 3.  Mild atherosclerotic calcification of the right bifurcation with up to 20% stenosis by NASCET criteria. 4.  Scattered atherosclerotic calcification otherwise as described above. 5.  Severe emphysema within the imaged lung apices. COMMUNICATION:  Communicated with: Dr. Chadwick on  6/21/2024 at 9:45 p.m. Electronically signed by Martir De La Garza    CT HEAD WO CONTRAST    Result Date: 6/21/2024  Head CT INDICATION: Stroke TECHNIQUE: Multiple 2D axial images obtained through the brain without intravenous contrast.  Radiation dose reduction techniques were used for this study:  All CT scans performed at this facility use one or all of the following: Automated exposure control, adjustment of the mA and/or kVp according to patient's size, iterative reconstruction. COMPARISON: 2008 FINDINGS: No acute intracranial CT findings. Microvascular ischemia. No areas of abnormal attenuation are seen in the brain. There is no CT evidence of acute hemorrhage or infarction. The ventricles are normal in size. There are no extra-axial fluid collections. No masses are seen. The sinuses are clear. MULTIFOCAL SCLEROTIC LESIONS THROUGHOUT THE VISUALIZED CRANIUM. CORRELATE FOR METASTATIC DISEASE.     As above. Electronically signed by Jorge Luis Kramer      Electronically signed by MEGAN CONKLIN CNP on 6/22/2024 at 2:09 PM

## 2024-06-22 NOTE — PROGRESS NOTES
4 Eyes Skin Assessment     NAME:  Kiersten Bennett  YOB: 1950  MEDICAL RECORD NUMBER:  [id number]    The patient is being assessed for  Admission    I agree that at least one RN has performed a thorough Head to Toe Skin Assessment on the patient. ALL assessment sites listed below have been assessed.      Areas assessed by both nurses:    Head, Face, Ears, Shoulders, Back, Chest, Arms, Elbows, Hands, Sacrum. Buttock, Coccyx, Ischium, and Legs. Feet and Heels        Does the Patient have a Wound? No noted wound(s)       Quang Prevention initiated by RN: No  Wound Care Orders initiated by RN: No    Pressure Injury (Stage 3,4, Unstageable, DTI, NWPT, and Complex wounds) if present, place Wound referral order by RN under : No    New Ostomies, if present place, Ostomy referral order under : No     Nurse 1 eSignature: Electronically signed by Dom Zhang RN on 6/22/24 at 2:53 AM EDT    **SHARE this note so that the co-signing nurse can place an eSignature**    Nurse 2 eSignature: {Esignature:[id number]}

## 2024-06-23 LAB
BILIRUBIN, URINE: ABNORMAL MG/DL
BLOOD, URINE: NEGATIVE
CLARITY, UA: CLEAR
COLOR, UA: YELLOW
COMMENT UA: ABNORMAL
EKG ATRIAL RATE: 106 BPM
EKG ATRIAL RATE: 81 BPM
EKG DIAGNOSIS: NORMAL
EKG DIAGNOSIS: NORMAL
EKG P AXIS: 75 DEGREES
EKG P AXIS: 91 DEGREES
EKG P-R INTERVAL: 168 MS
EKG P-R INTERVAL: 180 MS
EKG Q-T INTERVAL: 356 MS
EKG Q-T INTERVAL: 420 MS
EKG QRS DURATION: 82 MS
EKG QRS DURATION: 86 MS
EKG QTC CALCULATION (BAZETT): 472 MS
EKG QTC CALCULATION (BAZETT): 487 MS
EKG R AXIS: -10 DEGREES
EKG R AXIS: -12 DEGREES
EKG T AXIS: 18 DEGREES
EKG T AXIS: 77 DEGREES
EKG VENTRICULAR RATE: 106 BPM
EKG VENTRICULAR RATE: 81 BPM
EPITHELIAL CELLS, UA: 3 /HPF
GLUCOSE URINE: NEGATIVE MG/DL
KETONES, URINE: ABNORMAL MG/DL
LEUKOCYTE ESTERASE, URINE: NEGATIVE
NITRITE URINE, QUANTITATIVE: NEGATIVE
PH, URINE: 5.5 (ref 5–8)
PROTEIN UA: 30 MG/DL
RBC URINE: 2 /HPF (ref 0–6)
SPECIFIC GRAVITY UA: >1.03 (ref 1–1.03)
UROBILINOGEN, URINE: 1 MG/DL (ref 0.2–1)
WBC UA: 10 /HPF (ref 0–5)

## 2024-06-23 PROCEDURE — 81001 URINALYSIS AUTO W/SCOPE: CPT

## 2024-06-23 PROCEDURE — 6370000000 HC RX 637 (ALT 250 FOR IP): Performed by: NURSE PRACTITIONER

## 2024-06-23 PROCEDURE — 6370000000 HC RX 637 (ALT 250 FOR IP): Performed by: STUDENT IN AN ORGANIZED HEALTH CARE EDUCATION/TRAINING PROGRAM

## 2024-06-23 PROCEDURE — 93010 ELECTROCARDIOGRAM REPORT: CPT | Performed by: INTERNAL MEDICINE

## 2024-06-23 PROCEDURE — 99233 SBSQ HOSP IP/OBS HIGH 50: CPT | Performed by: NURSE PRACTITIONER

## 2024-06-23 PROCEDURE — 1200000000 HC SEMI PRIVATE

## 2024-06-23 PROCEDURE — 94761 N-INVAS EAR/PLS OXIMETRY MLT: CPT

## 2024-06-23 PROCEDURE — 6360000002 HC RX W HCPCS: Performed by: STUDENT IN AN ORGANIZED HEALTH CARE EDUCATION/TRAINING PROGRAM

## 2024-06-23 RX ORDER — TRAZODONE HYDROCHLORIDE 50 MG/1
50 TABLET ORAL NIGHTLY PRN
Status: DISCONTINUED | OUTPATIENT
Start: 2024-06-23 | End: 2024-06-25 | Stop reason: HOSPADM

## 2024-06-23 RX ADMIN — PANTOPRAZOLE SODIUM 20 MG: 20 TABLET, DELAYED RELEASE ORAL at 06:49

## 2024-06-23 RX ADMIN — LEVOTHYROXINE SODIUM 25 MCG: 25 TABLET ORAL at 06:49

## 2024-06-23 RX ADMIN — ROPINIROLE HYDROCHLORIDE 1 MG: 1 TABLET, FILM COATED ORAL at 20:03

## 2024-06-23 RX ADMIN — ASPIRIN 81 MG: 81 TABLET, CHEWABLE ORAL at 07:58

## 2024-06-23 RX ADMIN — ROSUVASTATIN CALCIUM 20 MG: 20 TABLET, COATED ORAL at 20:03

## 2024-06-23 RX ADMIN — POLYETHYLENE GLYCOL (3350) 17 G: 17 POWDER, FOR SOLUTION ORAL at 07:58

## 2024-06-23 RX ADMIN — CLOPIDOGREL BISULFATE 75 MG: 75 TABLET ORAL at 07:58

## 2024-06-23 RX ADMIN — ENOXAPARIN SODIUM 40 MG: 100 INJECTION SUBCUTANEOUS at 07:57

## 2024-06-23 RX ADMIN — ROPINIROLE HYDROCHLORIDE 1 MG: 1 TABLET, FILM COATED ORAL at 07:58

## 2024-06-23 ASSESSMENT — PAIN SCALES - GENERAL: PAINLEVEL_OUTOF10: 0

## 2024-06-23 NOTE — PROGRESS NOTES
V2.0  [facility] [place], [place] Hospitalist Progress Note      Name:  Kiersten Bennett /Age/Sex: 1950  (74 y.o. female)   MRN & CSN:  [id number] & [id number] Encounter Date/Time: 2024 11:21 AM EDT    Location:  OBS  PCP: No primary care provider on file.       Hospital Day: 3    Assessment and Plan:   Kiersten Bennett is a 74 y.o. female with pmh of hypertension, hyperlipidemia, hypothyroidism, GERD, RLS, right-sided Bell's palsy who presents with Stroke-like symptoms.  Patient was found small embolic infarct in left middle cerebral artery territory secondary to high-grade stenosis in left internal carotid artery and intracranial middle cerebral artery.  Pending angiogram and possible carotid artery stent.  On Monday around 1 PM.    Plan:  Acute CVA  Stenosis of left ICA  -MRI Brain: Punctate foci of restricted diffusion are demonstrated within the cortex of the left lateral parietal lobe and the superior left cortical parietal lobe in a patient with known history of left MCA stenosis.. Please see CTA of the head and neck performed 2024.  -Neuro IR Consulted: Dr. Garcia, will do Angiogram with likely carotid artery stent placement this Monday at 1 pm, Plavix 300 mg load x 1 today, then continue Plavix 75 mg PO qd thereafter for 3 months   -Neurology Consulted: Dr. Hilton      Hypertension  -/48  -Currently all hypertensive medications have been held, monitor BP, keep systolic BP lower than 140     Hypothyroidism  -Continue home Synthroid     GERD  -Continue home Protonix     RLS  -Continue home Requip     Tobacco use  -Refusing NRT, counseled on cessation    Diet ADULT DIET; Regular; Low Sodium (2 gm)  Diet NPO Exceptions are: Ice Chips, Sips of Water with Meds   DVT Prophylaxis [x] Lovenox, []  Heparin, [] SCDs, [] Ambulation,  [] Eliquis, [] Xarelto  [] Coumadin   Code Status Full Code   Disposition From: Home  Expected Disposition: Home  Estimated Date of Discharge: 1 to 2 days  Patient  6/21/2024  CTA CODE NEURO HEAD AND NECK W CONT HISTORY:  slurred speach, right facial droop, right arm weakness TECHNIQUE:  CTA head and neck. Post-processed images (Maximum intensity Projection (MIP), Volume-rendered (VR), or Surface shaded display images (SSD))were created, reviewed and archived. All CT scans at this facility use dose modulation, iterative reconstruction, and/or weight based dosing when appropriate to reduce radiation dose to as low as reasonably achievable. Contrast: IV administration of 100 cc Isovue-370 COMPARISON: None. RESULT: NECK: Soft tissues:  Within normal limits. Spine:  Alignment is normal.  Mild degenerative changes are present. Lungs: Severe centrilobular emphysema within the imaged lung apices. CT ARTERIOGRAM:     EXTRACRANIAL CIRCULATION: Aortic arch and branch vessels: 4 vessel arch branch anatomy. The left vertebral artery originates from the aortic arch. Mild atherosclerotic calcification without significant stenosis in the proximal brachiocephalic vessels. Carotid Stenosis: Right Common:  No significant stenosis.  Right Internal Carotid  Plaque: Mild-moderate calcified and noncalcified plaque. Right Internal Carotid Stenosis (% by NASCET Criteria): 20% Left Common:  No significant stenosis. Left Internal Carotid Plaque: Severe calcified and noncalcified plaque with severe luminal narrowing of the bifurcation and proximal ICA. Left Internal Carotid Stenosis (% by NASCET Criteria): 90%; cervical ICA (series 306 image 108) Cervical Vertebral Arteries: Left vertebral artery originates from the aortic arch. Otherwise, the bilateral origins are within normal limits. Patency: Bilateral Dominance:  Left INTRACRANIAL CIRCULATION: Anterior circulation: Mild atherosclerotic calcification of the cavernous and clinoid ICA without hemodynamically significant stenosis. There is up to severe stenosis/focal thrombosis within the left M2 segment at the anterior insular region (series 607 image  infarction. The ventricles are normal in size. There are no extra-axial fluid collections. No masses are seen. The sinuses are clear. MULTIFOCAL SCLEROTIC LESIONS THROUGHOUT THE VISUALIZED CRANIUM. CORRELATE FOR METASTATIC DISEASE.     As above. Electronically signed by Jorge Luis Kramer      Electronically signed by MEGAN Marquez CNP on 6/23/2024 at 11:21 AM

## 2024-06-23 NOTE — PLAN OF CARE
Problem: Discharge Planning  Goal: Discharge to home or other facility with appropriate resources  6/23/2024 0956 by Yenny Carvalho, RN  Outcome: Progressing  6/22/2024 2024 by Dom Zhang RN  Outcome: Progressing     Problem: Pain  Goal: Verbalizes/displays adequate comfort level or baseline comfort level  6/23/2024 0956 by Yenny Carvalho, RN  Outcome: Progressing  6/22/2024 2024 by Dom Zhang RN  Outcome: Progressing

## 2024-06-23 NOTE — PLAN OF CARE
Problem: Discharge Planning  Goal: Discharge to home or other facility with appropriate resources  6/23/2024 1259 by Coby Pettit, RN  Outcome: Progressing  6/23/2024 0956 by Yenny Carvalho RN  Outcome: Progressing     Problem: Pain  Goal: Verbalizes/displays adequate comfort level or baseline comfort level  6/23/2024 1259 by Coby Pettit RN  Outcome: Progressing  6/23/2024 0956 by Yenny Carvalho RN  Outcome: Progressing

## 2024-06-23 NOTE — PROGRESS NOTES
Neurology Service Progress Note  [facility]   Patient Name: Kiersten Bennett  : 1950        Subjective:   Reason for consult: Stroke like symptoms  Chart was reviewed in detail, patient was seen and assessed.  She is resting in bed this morning, family at bedside.  Overall she is doing quite well.  Does feel that she may have slightly more pronounced right facial weakness secondary to her Bell's palsy than her baseline.  Otherwise no recurrence of facial numbness or right upper extremity weakness since admission.  MRI brain was completed with punctate infarct in the left hemisphere.  Plan for angiogram with possible carotid stenting on Monday.      History reviewed. No pertinent past medical history. :   History reviewed. No pertinent surgical history.  Medications:  Scheduled Meds:   [Held by provider] amLODIPine  10 mg Oral Daily    levothyroxine  25 mcg Oral Daily    [Held by provider] lisinopril-hydroCHLOROthiazide  1 tablet Oral Daily    [Held by provider] metoprolol tartrate  50 mg Oral BID    pantoprazole  20 mg Oral QAM AC    rOPINIRole  1 mg Oral TID    rosuvastatin  20 mg Oral Nightly    clopidogrel  75 mg Oral Daily    polyethylene glycol  17 g Oral Daily    enoxaparin  40 mg SubCUTAneous Daily    aspirin  81 mg Oral Daily     Continuous Infusions:  PRN Meds:.traZODone, sennosides-docusate sodium, melatonin, ondansetron **OR** ondansetron    No Known Allergies  Social History     Socioeconomic History    Marital status:      Spouse name: Not on file    Number of children: Not on file    Years of education: Not on file    Highest education level: Not on file   Occupational History    Not on file   Tobacco Use    Smoking status: Every Day     Current packs/day: 2.00     Types: Cigarettes    Smokeless tobacco: Never   Substance and Sexual Activity    Alcohol use: Never    Drug use: Never    Sexual activity: Not on file   Other Topics Concern    Not on file   Social History  per IM  Neurointervention following, plan for angiogram and possible carotid stenting Monday  PT/OT/ST as recommended  Will continue to follow loosely pending clinical course    Patient was discussed with attending neurologist Dr. Hilton      > 50 minutes of time spent included chart review, obtaining history, patient examination, developing plan of care, and documentation.          Thank you for allowing us to participate in the care of your patient.  If there are any questions regarding evaluation please feel free to contact us.

## 2024-06-24 ENCOUNTER — APPOINTMENT (OUTPATIENT)
Dept: INTERVENTIONAL RADIOLOGY/VASCULAR | Age: 74
DRG: 034 | End: 2024-06-24
Payer: MEDICARE

## 2024-06-24 ENCOUNTER — CARE COORDINATION (OUTPATIENT)
Dept: MEDSURG UNIT | Age: 74
End: 2024-06-24

## 2024-06-24 PROBLEM — I63.032 CEREBROVASCULAR ACCIDENT (CVA) DUE TO THROMBOSIS OF LEFT CAROTID ARTERY (HCC): Status: ACTIVE | Noted: 2024-06-24

## 2024-06-24 LAB — ACTIVATED CLOTTING TIME, LOW RANGE: 298 SEC

## 2024-06-24 PROCEDURE — 6360000002 HC RX W HCPCS: Performed by: PSYCHIATRY & NEUROLOGY

## 2024-06-24 PROCEDURE — 99232 SBSQ HOSP IP/OBS MODERATE 35: CPT | Performed by: CLINICAL NURSE SPECIALIST

## 2024-06-24 PROCEDURE — C1894 INTRO/SHEATH, NON-LASER: HCPCS

## 2024-06-24 PROCEDURE — B31N1ZZ FLUOROSCOPY OF OTHER UPPER ARTERIES USING LOW OSMOLAR CONTRAST: ICD-10-PCS | Performed by: PSYCHIATRY & NEUROLOGY

## 2024-06-24 PROCEDURE — 2000000000 HC ICU R&B

## 2024-06-24 PROCEDURE — 99152 MOD SED SAME PHYS/QHP 5/>YRS: CPT

## 2024-06-24 PROCEDURE — 85347 COAGULATION TIME ACTIVATED: CPT

## 2024-06-24 PROCEDURE — 6360000004 HC RX CONTRAST MEDICATION

## 2024-06-24 PROCEDURE — 6370000000 HC RX 637 (ALT 250 FOR IP): Performed by: NURSE PRACTITIONER

## 2024-06-24 PROCEDURE — 36225 PLACE CATH SUBCLAVIAN ART: CPT

## 2024-06-24 PROCEDURE — 2580000003 HC RX 258: Performed by: NURSE PRACTITIONER

## 2024-06-24 PROCEDURE — 36226 PLACE CATH VERTEBRAL ART: CPT | Performed by: PSYCHIATRY & NEUROLOGY

## 2024-06-24 PROCEDURE — 99153 MOD SED SAME PHYS/QHP EA: CPT

## 2024-06-24 PROCEDURE — 36226 PLACE CATH VERTEBRAL ART: CPT

## 2024-06-24 PROCEDURE — 36227 PLACE CATH XTRNL CAROTID: CPT

## 2024-06-24 PROCEDURE — 76937 US GUIDE VASCULAR ACCESS: CPT

## 2024-06-24 PROCEDURE — B3161ZZ FLUOROSCOPY OF RIGHT INTERNAL CAROTID ARTERY USING LOW OSMOLAR CONTRAST: ICD-10-PCS | Performed by: PSYCHIATRY & NEUROLOGY

## 2024-06-24 PROCEDURE — 2500000003 HC RX 250 WO HCPCS: Performed by: PSYCHIATRY & NEUROLOGY

## 2024-06-24 PROCEDURE — B3141ZZ FLUOROSCOPY OF LEFT COMMON CAROTID ARTERY USING LOW OSMOLAR CONTRAST: ICD-10-PCS | Performed by: PSYCHIATRY & NEUROLOGY

## 2024-06-24 PROCEDURE — 37215 TRANSCATH STENT CCA W/EPS: CPT | Performed by: PSYCHIATRY & NEUROLOGY

## 2024-06-24 PROCEDURE — 94761 N-INVAS EAR/PLS OXIMETRY MLT: CPT

## 2024-06-24 PROCEDURE — 6370000000 HC RX 637 (ALT 250 FOR IP): Performed by: STUDENT IN AN ORGANIZED HEALTH CARE EDUCATION/TRAINING PROGRAM

## 2024-06-24 PROCEDURE — B41F1ZZ FLUOROSCOPY OF RIGHT LOWER EXTREMITY ARTERIES USING LOW OSMOLAR CONTRAST: ICD-10-PCS | Performed by: PSYCHIATRY & NEUROLOGY

## 2024-06-24 PROCEDURE — B3191ZZ FLUOROSCOPY OF RIGHT EXTERNAL CAROTID ARTERY USING LOW OSMOLAR CONTRAST: ICD-10-PCS | Performed by: PSYCHIATRY & NEUROLOGY

## 2024-06-24 PROCEDURE — B31F1ZZ FLUOROSCOPY OF LEFT VERTEBRAL ARTERY USING LOW OSMOLAR CONTRAST: ICD-10-PCS | Performed by: PSYCHIATRY & NEUROLOGY

## 2024-06-24 PROCEDURE — 037L3DZ DILATION OF LEFT INTERNAL CAROTID ARTERY WITH INTRALUMINAL DEVICE, PERCUTANEOUS APPROACH: ICD-10-PCS | Performed by: PSYCHIATRY & NEUROLOGY

## 2024-06-24 PROCEDURE — 36227 PLACE CATH XTRNL CAROTID: CPT | Performed by: PSYCHIATRY & NEUROLOGY

## 2024-06-24 PROCEDURE — 99223 1ST HOSP IP/OBS HIGH 75: CPT | Performed by: NURSE PRACTITIONER

## 2024-06-24 PROCEDURE — 36225 PLACE CATH SUBCLAVIAN ART: CPT | Performed by: PSYCHIATRY & NEUROLOGY

## 2024-06-24 PROCEDURE — 6360000002 HC RX W HCPCS

## 2024-06-24 PROCEDURE — 37215 TRANSCATH STENT CCA W/EPS: CPT

## 2024-06-24 PROCEDURE — 36224 PLACE CATH CAROTD ART: CPT

## 2024-06-24 PROCEDURE — 36224 PLACE CATH CAROTD ART: CPT | Performed by: PSYCHIATRY & NEUROLOGY

## 2024-06-24 RX ORDER — HYDRALAZINE HYDROCHLORIDE 20 MG/ML
10 INJECTION INTRAMUSCULAR; INTRAVENOUS EVERY 4 HOURS PRN
Status: DISCONTINUED | OUTPATIENT
Start: 2024-06-24 | End: 2024-06-25 | Stop reason: HOSPADM

## 2024-06-24 RX ORDER — SODIUM CHLORIDE 9 MG/ML
INJECTION, SOLUTION INTRAVENOUS PRN
Status: DISCONTINUED | OUTPATIENT
Start: 2024-06-24 | End: 2024-06-25 | Stop reason: HOSPADM

## 2024-06-24 RX ORDER — LIDOCAINE HYDROCHLORIDE 10 MG/ML
INJECTION, SOLUTION EPIDURAL; INFILTRATION; INTRACAUDAL; PERINEURAL PRN
Status: COMPLETED | OUTPATIENT
Start: 2024-06-24 | End: 2024-06-24

## 2024-06-24 RX ORDER — HEPARIN SODIUM 1000 [USP'U]/ML
INJECTION, SOLUTION INTRAVENOUS; SUBCUTANEOUS PRN
Status: COMPLETED | OUTPATIENT
Start: 2024-06-24 | End: 2024-06-24

## 2024-06-24 RX ORDER — ONDANSETRON 2 MG/ML
4 INJECTION INTRAMUSCULAR; INTRAVENOUS EVERY 6 HOURS PRN
Status: DISCONTINUED | OUTPATIENT
Start: 2024-06-24 | End: 2024-06-25 | Stop reason: HOSPADM

## 2024-06-24 RX ORDER — FENTANYL CITRATE 50 UG/ML
INJECTION, SOLUTION INTRAMUSCULAR; INTRAVENOUS PRN
Status: COMPLETED | OUTPATIENT
Start: 2024-06-24 | End: 2024-06-24

## 2024-06-24 RX ORDER — LABETALOL HYDROCHLORIDE 5 MG/ML
5 INJECTION, SOLUTION INTRAVENOUS EVERY 4 HOURS PRN
Status: DISCONTINUED | OUTPATIENT
Start: 2024-06-24 | End: 2024-06-25 | Stop reason: HOSPADM

## 2024-06-24 RX ORDER — SODIUM CHLORIDE 0.9 % (FLUSH) 0.9 %
5-40 SYRINGE (ML) INJECTION EVERY 12 HOURS SCHEDULED
Status: DISCONTINUED | OUTPATIENT
Start: 2024-06-24 | End: 2024-06-25 | Stop reason: HOSPADM

## 2024-06-24 RX ORDER — MIDAZOLAM HYDROCHLORIDE 2 MG/2ML
INJECTION, SOLUTION INTRAMUSCULAR; INTRAVENOUS PRN
Status: COMPLETED | OUTPATIENT
Start: 2024-06-24 | End: 2024-06-24

## 2024-06-24 RX ORDER — SODIUM CHLORIDE 0.9 % (FLUSH) 0.9 %
5-40 SYRINGE (ML) INJECTION PRN
Status: DISCONTINUED | OUTPATIENT
Start: 2024-06-24 | End: 2024-06-25 | Stop reason: HOSPADM

## 2024-06-24 RX ORDER — SODIUM CHLORIDE 9 MG/ML
INJECTION, SOLUTION INTRAVENOUS CONTINUOUS
Status: DISPENSED | OUTPATIENT
Start: 2024-06-24 | End: 2024-06-24

## 2024-06-24 RX ORDER — ACETAMINOPHEN 325 MG/1
650 TABLET ORAL EVERY 4 HOURS PRN
Status: DISCONTINUED | OUTPATIENT
Start: 2024-06-24 | End: 2024-06-25 | Stop reason: HOSPADM

## 2024-06-24 RX ORDER — ONDANSETRON 4 MG/1
4 TABLET, ORALLY DISINTEGRATING ORAL EVERY 8 HOURS PRN
Status: DISCONTINUED | OUTPATIENT
Start: 2024-06-24 | End: 2024-06-25 | Stop reason: HOSPADM

## 2024-06-24 RX ADMIN — ROPINIROLE HYDROCHLORIDE 1 MG: 1 TABLET, FILM COATED ORAL at 21:13

## 2024-06-24 RX ADMIN — MIDAZOLAM HYDROCHLORIDE 1 MG: 1 INJECTION, SOLUTION INTRAMUSCULAR; INTRAVENOUS at 14:11

## 2024-06-24 RX ADMIN — LIDOCAINE HYDROCHLORIDE 8 ML: 10 INJECTION, SOLUTION EPIDURAL; INFILTRATION; INTRACAUDAL; PERINEURAL at 14:15

## 2024-06-24 RX ADMIN — CLOPIDOGREL BISULFATE 75 MG: 75 TABLET ORAL at 09:15

## 2024-06-24 RX ADMIN — HEPARIN SODIUM 6000 UNITS: 1000 INJECTION INTRAVENOUS; SUBCUTANEOUS at 14:49

## 2024-06-24 RX ADMIN — FENTANYL CITRATE 50 MCG: 50 INJECTION, SOLUTION INTRAMUSCULAR; INTRAVENOUS at 14:11

## 2024-06-24 RX ADMIN — ASPIRIN 81 MG: 81 TABLET, CHEWABLE ORAL at 09:15

## 2024-06-24 RX ADMIN — PANTOPRAZOLE SODIUM 20 MG: 20 TABLET, DELAYED RELEASE ORAL at 09:21

## 2024-06-24 RX ADMIN — ROSUVASTATIN CALCIUM 20 MG: 20 TABLET, COATED ORAL at 21:13

## 2024-06-24 RX ADMIN — SODIUM CHLORIDE: 9 INJECTION, SOLUTION INTRAVENOUS at 16:31

## 2024-06-24 RX ADMIN — LEVOTHYROXINE SODIUM 25 MCG: 25 TABLET ORAL at 09:21

## 2024-06-24 RX ADMIN — FENTANYL CITRATE 25 MCG: 50 INJECTION, SOLUTION INTRAMUSCULAR; INTRAVENOUS at 15:08

## 2024-06-24 RX ADMIN — SODIUM CHLORIDE, PRESERVATIVE FREE 10 ML: 5 INJECTION INTRAVENOUS at 21:12

## 2024-06-24 RX ADMIN — TRAZODONE HYDROCHLORIDE 50 MG: 50 TABLET ORAL at 00:16

## 2024-06-24 RX ADMIN — TRAZODONE HYDROCHLORIDE 50 MG: 50 TABLET ORAL at 21:12

## 2024-06-24 ASSESSMENT — PULMONARY FUNCTION TESTS
PIF_VALUE: 0

## 2024-06-24 ASSESSMENT — PAIN SCALES - GENERAL: PAINLEVEL_OUTOF10: 0

## 2024-06-24 NOTE — PRE SEDATION
Sedation Pre-Procedure Note    Patient Name: Kiersten Bennett   YOB: 1950  Room/Bed: 3025/3025-A  Medical Record Number: [id number]  Date: 6/24/2024   Time: 10:51 AM       Indication: Symptomatic left internal carotid artery    Consent: I have discussed with the patient and/or the patient representative the indication, alternatives, and the possible risks and/or complications of the planned procedure and the anesthesia methods. The patient and/or patient representative appear to understand and agree to proceed.    Vital Signs:   Vitals:    06/24/24 0800   BP: (!) 139/53   Pulse: 80   Resp: 15   Temp: 98.1 °F (36.7 °C)   SpO2: 93%       Past Medical History:   has no past medical history on file.    Past Surgical History:   has no past surgical history on file.    Medications:   Scheduled Meds:    amLODIPine  10 mg Oral Daily    levothyroxine  25 mcg Oral Daily    [Held by provider] lisinopril-hydroCHLOROthiazide  1 tablet Oral Daily    [Held by provider] metoprolol tartrate  50 mg Oral BID    pantoprazole  20 mg Oral QAM AC    rOPINIRole  1 mg Oral TID    rosuvastatin  20 mg Oral Nightly    clopidogrel  75 mg Oral Daily    polyethylene glycol  17 g Oral Daily    enoxaparin  40 mg SubCUTAneous Daily    aspirin  81 mg Oral Daily     Continuous Infusions:   PRN Meds: traZODone, sennosides-docusate sodium, melatonin, ondansetron **OR** ondansetron  Home Meds:   Prior to Admission medications    Medication Sig Start Date End Date Taking? Authorizing Provider   rosuvastatin (CRESTOR) 20 MG tablet Take 1 tablet by mouth at bedtime   Yes Provider, Historical, MD   metoprolol tartrate (LOPRESSOR) 50 MG tablet Take 1 tablet by mouth 2 times daily   Yes Provider, Historical, MD   lisinopril-hydroCHLOROthiazide (PRINZIDE;ZESTORETIC) 20-12.5 MG per tablet Take 1 tablet by mouth daily   Yes Provider, Historical, MD   omeprazole (PRILOSEC) 20 MG delayed release capsule Take 1 capsule by mouth daily   Yes Provider,

## 2024-06-24 NOTE — CARE COORDINATION
06/24/24 0900   Service Assessment   Patient Orientation Alert and Oriented   Cognition Alert   History Provided By Medical Record   Primary Caregiver Self   Support Systems Spouse/Significant Other   Patient's Healthcare Decision Maker is: Legal Next of Kin   PCP Verified by CM Yes   Prior Functional Level Independent in ADLs/IADLs   Current Functional Level Independent in ADLs/IADLs   Can patient return to prior living arrangement Yes   Ability to make needs known: Good   Family able to assist with home care needs: Yes   Would you like for me to discuss the discharge plan with any other family members/significant others, and if so, who? No   Financial Resources Medicare   Community Resources None     Chart reviewed, screened for discharge planning. Pt from home with spouse.  PT/OT recommend home.  No discharge needs identified at this time.  CM following

## 2024-06-24 NOTE — OR NURSING
PROCEDURE START: 1415  PROCEDURE END: 1518  FLUORO TIME: 14.9  AK: 458  DAP: 148  CONTRAST VOLUME: 80    ARRHYTHMIA:  INTERVENTION:

## 2024-06-24 NOTE — PROGRESS NOTES
[place]2.0  [facility] [place] [facility] [place]      Name:  Kiersten Bennett /Age/Sex: 1950  (74 y.o. female)   MRN & CSN:  [id number] & [id number] Encounter Date/Time: 2024 10:41 AM EDT    Location:  [facility] PCP: No primary care provider on file.       Hospital Day: 4    Assessment and Plan:   Kiersten Bennett is a 74 y.o. female with pmh of hypertension, hyperlipidemia, hypothyroidism, GERD, RLS, right-sided Bell's palsy who presents with Stroke-like symptoms.  Patient was found small embolic infarct in left middle cerebral artery territory secondary to high-grade stenosis in left internal carotid artery and intracranial middle cerebral artery.  Pending angiogram and possible carotid artery stent today.  Possible discharge tomorrow.    Plan:  Acute CVA  Stenosis of left ICA  -MRI Brain: Punctate foci of restricted diffusion are demonstrated within the cortex of the left lateral parietal lobe and the superior left cortical parietal lobe in a patient with known history of left MCA stenosis.. Please see CTA of the head and neck performed 2024.  -Neuro IR Consulted: Dr. Garcia, will do Angiogram with likely carotid artery stent placement today Plavix 300 mg load x 1 today, then continue Plavix 75 mg PO qd thereafter for 3 months   -Appreciate neurology input agreed DAPT for secondary stroke prevention.  Will follow-up     Hypertension  -BP has being a little bit high at 150s yesterday.  -Resume amlodipine 10 Mg daily  -Keep monitor BP, the goal is a systolic BP lower than 140     Hypothyroidism  -Continue home Synthroid     GERD  -Continue home Protonix     RLS  -Continue home Requip     Tobacco use  -Refusing NRT, counseled on cessation    Diet Diet NPO Exceptions are: Ice Chips, Sips of Water with Meds   DVT Prophylaxis [x] Lovenox, []  Heparin, [] SCDs, [] Ambulation,  [] Eliquis, [] Xarelto  [] Coumadin   Code Status Full Code   Disposition From: Home  Expected Disposition: Home  Estimated Date of  Discharge: 1 to 2 days  Patient requires continued admission due to medical condition   Surrogate Decision Maker/ POA      Subjective:     Chief Complaint: Facial Droop and Extremity Weakness (Right arm)       Kiersten Bennett is a 74 y.o. female is evaluated in the room.  Patient reported resolved symptoms         Review of Systems:    Review of Systems   All other systems reviewed and are negative.          Objective:   No intake or output data in the 24 hours ending 06/24/24 1041     Vitals:   Vitals:    06/24/24 0800   BP: (!) 139/53   Pulse: 80   Resp: 15   Temp: 98.1 °F (36.7 °C)   SpO2: 93%       Physical Exam:     General: NAD  Eyes: EOMI  ENT: neck supple  Cardiovascular: Regular rate.  Respiratory: Clear to auscultation  Gastrointestinal: Soft, non tender  Genitourinary: no suprapubic tenderness  Musculoskeletal: No edema  Skin: warm, dry  Neuro: Alert, oriented, no focal neurology deficit  Psych: Mood appropriate.     Medications:   Medications:    [Held by provider] amLODIPine  10 mg Oral Daily    levothyroxine  25 mcg Oral Daily    [Held by provider] lisinopril-hydroCHLOROthiazide  1 tablet Oral Daily    [Held by provider] metoprolol tartrate  50 mg Oral BID    pantoprazole  20 mg Oral QAM AC    rOPINIRole  1 mg Oral TID    rosuvastatin  20 mg Oral Nightly    clopidogrel  75 mg Oral Daily    polyethylene glycol  17 g Oral Daily    enoxaparin  40 mg SubCUTAneous Daily    aspirin  81 mg Oral Daily      Infusions:   PRN Meds: traZODone, 50 mg, Nightly PRN  sennosides-docusate sodium, 2 tablet, Daily PRN  melatonin, 3 mg, Nightly PRN  ondansetron, 4 mg, Q8H PRN   Or  ondansetron, 4 mg, Q6H PRN        Labs      Recent Results (from the past 24 hour(s))   Urinalysis    Collection Time: 06/23/24  6:02 PM   Result Value Ref Range    Color, UA YELLOW YELLOW    Clarity, UA CLEAR CLEAR    Glucose, Ur NEGATIVE NEGATIVE MG/DL    Bilirubin, Urine SMALL NUMBER OR AMOUNT OBSERVED (A) NEGATIVE MG/DL    Ketones, Urine  are patent.  Calvarium: No evidence of depressed skull fracture. Soft tissues:  Normal.  Orbits: Orbits demonstrate postoperative changes from prior cataract resection with prosthetic lens implant.     1. Punctate foci of restricted diffusion are demonstrated within the cortex of the left lateral parietal lobe and the superior left cortical parietal lobe in a patient with known history of left MCA stenosis.. Please see CTA of the head and neck performed 21 June 2024. 2. Chronic small vessel ischemic disease. Critical results reported to   RN   at  12 noon . Electronically signed by Tori Shi      Electronically signed by MEGAN Marquez CNP on 6/24/2024 at 10:41 AM

## 2024-06-24 NOTE — PROGRESS NOTES
Called and spoke to charge of icu and asked to speak with nurse taken pt she stated she would have her call me back

## 2024-06-24 NOTE — CONSULTS
Vascular/Interventional Neurology Service Consult Note  [facility]   Patient Name: Kiersten Bennett  : 1950        Subjective:   Reason for consult:   Kiersten Bennett 74 -year-old female PMH HTN, HLD, GERD, restless leg syndrome, right Bell's palsy, hypothyroidism presenting to [facility] complaints of aphasia, right upper extremity numbness and facial numbness.  A stroke alert was called.  She was evaluated by [facility] teleneurology.  Initial NIHSS 1 .  Not a candidate for acute intervention.  CTA demonstrated left ICA severe stenosis and left M2 severe focal stenosis.  MRI of the brain showed small ischemic stroke in the left MCA territory.  The patient was initiated on aspirin and loaded with Plavix.  Plan for cerebral angiogram with possible carotid stenting today.    Upon exam the patient is alert and oriented x 4. She reports resolution of symptoms. Denies any speech changes. She has chronic right facial weakness from bells palsy. Strength is strong and equal.  Her  is present at bedside, we discussed angiogram procedure and questions were.  Denies further needs.      History reviewed. No pertinent past medical history. :   History reviewed. No pertinent surgical history.  Medications:  Scheduled Meds:   [Held by provider] amLODIPine  10 mg Oral Daily    levothyroxine  25 mcg Oral Daily    [Held by provider] lisinopril-hydroCHLOROthiazide  1 tablet Oral Daily    [Held by provider] metoprolol tartrate  50 mg Oral BID    pantoprazole  20 mg Oral QAM AC    rOPINIRole  1 mg Oral TID    rosuvastatin  20 mg Oral Nightly    clopidogrel  75 mg Oral Daily    polyethylene glycol  17 g Oral Daily    enoxaparin  40 mg SubCUTAneous Daily    aspirin  81 mg Oral Daily     Continuous Infusions:  PRN Meds:.traZODone, sennosides-docusate sodium, melatonin, ondansetron **OR** ondansetron    No Known Allergies  Social History     Socioeconomic History    Marital status:

## 2024-06-24 NOTE — CARE COORDINATION
Attempted to see pt for stroke education.  Pt currently off the floor.  Will attempt again at a later time.

## 2024-06-24 NOTE — CONSULTS
IV Consult complete.  Nexiva 20g 1.75\" PIV Inserted in Left Forearm  x 1 attempt using sterile ultrasound guided technique.  Brisk blood return, flushes easy.

## 2024-06-24 NOTE — PROGRESS NOTES
Neurology Service Progress Note  [facility]   Patient Name: Kiersten Bennett  : 1950        Subjective:   Reason for consult: Stroke like symptoms  Chart was reviewed in detail, patient was seen and assessed.  She denies any needs. She is awaiting for cerebral angiogram with possible stent placement this afternoon.       History reviewed. No pertinent past medical history. :   History reviewed. No pertinent surgical history.  Medications:  Scheduled Meds:   amLODIPine  10 mg Oral Daily    levothyroxine  25 mcg Oral Daily    [Held by provider] lisinopril-hydroCHLOROthiazide  1 tablet Oral Daily    [Held by provider] metoprolol tartrate  50 mg Oral BID    pantoprazole  20 mg Oral QAM AC    rOPINIRole  1 mg Oral TID    rosuvastatin  20 mg Oral Nightly    clopidogrel  75 mg Oral Daily    polyethylene glycol  17 g Oral Daily    enoxaparin  40 mg SubCUTAneous Daily    aspirin  81 mg Oral Daily     Continuous Infusions:  PRN Meds:.traZODone, sennosides-docusate sodium, melatonin, ondansetron **OR** ondansetron    No Known Allergies  Social History     Socioeconomic History    Marital status:      Spouse name: Not on file    Number of children: Not on file    Years of education: Not on file    Highest education level: Not on file   Occupational History    Not on file   Tobacco Use    Smoking status: Every Day     Current packs/day: 2.00     Types: Cigarettes    Smokeless tobacco: Never   Substance and Sexual Activity    Alcohol use: Never    Drug use: Never    Sexual activity: Not on file   Other Topics Concern    Not on file   Social History Narrative    Not on file     Social Determinants of Health     Financial Resource Strain: Not on file   Food Insecurity: No Food Insecurity (2024)    Hunger Vital Sign     Worried About Running Out of Food in the Last Year: Never true     Ran Out of Food in the Last Year: Never true   Transportation Needs: No Transportation Needs (2024)     PRAPARE - Transportation     Lack of Transportation (Medical): No     Lack of Transportation (Non-Medical): No   Physical Activity: Not on file   Stress: Not on file   Social Connections: Not on file   Intimate Partner Violence: Not on file   Housing Stability: Low Risk  (6/22/2024)    Housing Stability Vital Sign     Unable to Pay for Housing in the Last Year: No     Number of Places Lived in the Last Year: 1     Unstable Housing in the Last Year: No      History reviewed. No pertinent family history.      Physical Exam:      Wt Readings from Last 3 Encounters:   06/23/24 74.2 kg (163 lb 9.3 oz)     Temp Readings from Last 3 Encounters:   06/24/24 98.1 °F (36.7 °C) (Oral)     BP Readings from Last 3 Encounters:   06/24/24 (!) 139/53     Pulse Readings from Last 3 Encounters:   06/24/24 80        Gen: A&O x 4, NAD, cooperative  HEENT: NC/AT, EOMI, PERRL, mmm,  neck supple, no meningeal signs  Heart: NSR  Lungs: Respirations even and unlabored  Ext: no edema, no calf tenderness b/l  Psych: normal mood and affect  Skin: no rashes or lesions    NEUROLOGIC EXAM:    Mental Status: A&O to self, location, month and year, NAD, speech clear, language fluent, repetition and naming intact, follows commands appropriately    Cranial Nerve Exam:   CN II-XII:  PERRL, VFF, no nystagmus, no gaze paresis, sensation V1-V3 intact b/l, muscles of facial expression mildly weak on the right as residual from Bell's palsy history; hearing intact to conversational tone, palate elevates symmetrically, shoulder elevation symmetric and tongue protrudes midline with movement side to side.    Motor Exam:       Strength 5/5 UE's/LE's b/l  Tone and bulk normal   No pronator drift    Deep Tendon Reflexes: 2/4 biceps, triceps, brachioradialis, patellar, and achilles b/l; flexor plantar responses b/l    Sensation: Intact light touch/pinprick/vibration UE's/LE's b/l    Coordination/Cerebellum:       Tremors--none      Rapidly alternating movements: no

## 2024-06-24 NOTE — CONSULTS
Consult Note 24        NAME: Kiersten Bennett  : 1950  MRN: [id number]      Assessment/Plan:  Kiersten Bennett is a 74 y.o. female with a history of hypertension, hyperlipidemia, hypothyroidism, GERD, RLS and right-sided Bell's palsy who presented to [facility] 2024 strokelike symptoms.  Admitted to ICU 2024 left internal carotid artery angiogram with stent    Problem list  Acute CVA  Stenosis of left ICA  Hypertension  Hx hypothyroidism  Current smoker      Neuro: Status post left internal carotid artery angiogram with stent.  MRI brain punctate foci of restricted diffusion within the cortex of left lateral partial lobe, and left MCA stenosis.  Continue Plavix and ASA.  Doing home antihypertensives neurochecks per protocol.  Interventional radiology following.  Repeat head CT in a.m.  Cardio: Hemodynamically stable.  Maintain SBP <140  Resp: No acute concerns  GI: PPI for PPx.  Advance diet as tolerated  : Stable, follow renal panel, replace electrolytes as needed.  Heme: Stable.  No signs of bleeding.  Hgb 14.0.  Transfuse for Hgb <7  ID: Does not appear to be infectious.  Monitor off of antibiotics  Endo: History hypothyroidism, continue Synthroid  MSK: PT OT as able    Tubes/Lines/Drains:    PIV  Code Status:   Full code       Thank you for allowing us to participate in the care of your patient. For any questions, please call the number of the covering hospitalist listed under the treatment team in care connect.    Current living situation: Home  Expected Disposition: Home  Estimated discharge date: 1-2 days      Chief Complaint / Reason for Consult:    Strokelike strokelike symptoms    History of Present Illness:    Kiersten Bennett is a 74 y.o. female with a history of hypertension, hyperlipidemia, hypothyroidism, GERD, RLS and right-sided Bell's palsy who presented to [facility] 2024 strokelike symptoms.  Admitted to ICU 2024 left internal carotid artery angiogram with stent.   techniques were used for this study:  All CT scans performed at this facility use one or all of the following: Automated exposure control, adjustment of the mA and/or kVp according to patient's size, iterative reconstruction. COMPARISON: 2008 FINDINGS: No acute intracranial CT findings. Microvascular ischemia. No areas of abnormal attenuation are seen in the brain. There is no CT evidence of acute hemorrhage or infarction. The ventricles are normal in size. There are no extra-axial fluid collections. No masses are seen. The sinuses are clear. MULTIFOCAL SCLEROTIC LESIONS THROUGHOUT THE VISUALIZED CRANIUM. CORRELATE FOR METASTATIC DISEASE.     As above. Electronically signed by Jorge Luis Kramer      CBC:   Recent Labs     06/21/24 2023 06/22/24  0729   WBC 13.2* 7.1   HGB 15.4 14.0    231     BMP:    Recent Labs     06/21/24 2023 06/22/24  0729    142   K 3.4* 3.3*   CL 97* 102   CO2 23 26   BUN 14 11   CREATININE 0.8 0.6   GLUCOSE 141* 103*     Hepatic:   Recent Labs     06/21/24 2023   AST 21   ALT 7*   BILITOT 0.5   ALKPHOS 70     Lipids:   Lab Results   Component Value Date/Time    CHOL 108 06/22/2024 07:29 AM    HDL 31 06/22/2024 07:29 AM    TRIG 165 06/22/2024 07:29 AM     Hemoglobin A1C:   Lab Results   Component Value Date/Time    LABA1C 6.2 06/22/2024 07:29 AM     TSH: No results found for: \"TSH\"  Troponin: No results found for: \"TROPONINT\"  Lactic Acid: No results for input(s): \"LACTA\" in the last 72 hours.  BNP: No results for input(s): \"PROBNP\" in the last 72 hours.  UA:  Lab Results   Component Value Date/Time    NITRU NEGATIVE 06/23/2024 06:02 PM    COLORU YELLOW 06/23/2024 06:02 PM    PHUR 5.5 06/23/2024 06:02 PM    WBCUA 10 06/23/2024 06:02 PM    RBCUA 2 06/23/2024 06:02 PM    CLARITYU CLEAR 06/23/2024 06:02 PM    LEUKOCYTESUR NEGATIVE 06/23/2024 06:02 PM    UROBILINOGEN 1.0 06/23/2024 06:02 PM    BILIRUBINUR SMALL NUMBER OR AMOUNT OBSERVED 06/23/2024 06:02 PM    BLOODU NEGATIVE 06/23/2024

## 2024-06-25 ENCOUNTER — TELEPHONE (OUTPATIENT)
Age: 74
End: 2024-06-25

## 2024-06-25 VITALS
TEMPERATURE: 97.5 F | WEIGHT: 163.58 LBS | HEART RATE: 73 BPM | BODY MASS INDEX: 28.98 KG/M2 | OXYGEN SATURATION: 97 % | RESPIRATION RATE: 20 BRPM | DIASTOLIC BLOOD PRESSURE: 66 MMHG | HEIGHT: 63 IN | SYSTOLIC BLOOD PRESSURE: 114 MMHG

## 2024-06-25 PROCEDURE — 94761 N-INVAS EAR/PLS OXIMETRY MLT: CPT

## 2024-06-25 PROCEDURE — 6360000002 HC RX W HCPCS: Performed by: STUDENT IN AN ORGANIZED HEALTH CARE EDUCATION/TRAINING PROGRAM

## 2024-06-25 PROCEDURE — 2580000003 HC RX 258: Performed by: NURSE PRACTITIONER

## 2024-06-25 PROCEDURE — 99232 SBSQ HOSP IP/OBS MODERATE 35: CPT | Performed by: CLINICAL NURSE SPECIALIST

## 2024-06-25 PROCEDURE — 6370000000 HC RX 637 (ALT 250 FOR IP): Performed by: NURSE PRACTITIONER

## 2024-06-25 PROCEDURE — 6370000000 HC RX 637 (ALT 250 FOR IP): Performed by: STUDENT IN AN ORGANIZED HEALTH CARE EDUCATION/TRAINING PROGRAM

## 2024-06-25 PROCEDURE — 99024 POSTOP FOLLOW-UP VISIT: CPT | Performed by: NURSE PRACTITIONER

## 2024-06-25 RX ORDER — ASPIRIN 81 MG/1
81 TABLET, CHEWABLE ORAL DAILY
Qty: 30 TABLET | Refills: 3 | Status: SHIPPED | OUTPATIENT
Start: 2024-06-26

## 2024-06-25 RX ORDER — CLOPIDOGREL BISULFATE 75 MG/1
75 TABLET ORAL DAILY
Qty: 30 TABLET | Refills: 3 | Status: SHIPPED | OUTPATIENT
Start: 2024-06-26

## 2024-06-25 RX ADMIN — ASPIRIN 81 MG: 81 TABLET, CHEWABLE ORAL at 09:10

## 2024-06-25 RX ADMIN — CLOPIDOGREL BISULFATE 75 MG: 75 TABLET ORAL at 09:09

## 2024-06-25 RX ADMIN — ROPINIROLE HYDROCHLORIDE 1 MG: 1 TABLET, FILM COATED ORAL at 14:41

## 2024-06-25 RX ADMIN — SODIUM CHLORIDE, PRESERVATIVE FREE 10 ML: 5 INJECTION INTRAVENOUS at 09:10

## 2024-06-25 RX ADMIN — ROPINIROLE HYDROCHLORIDE 1 MG: 1 TABLET, FILM COATED ORAL at 09:10

## 2024-06-25 RX ADMIN — POLYETHYLENE GLYCOL (3350) 17 G: 17 POWDER, FOR SOLUTION ORAL at 09:09

## 2024-06-25 RX ADMIN — ENOXAPARIN SODIUM 40 MG: 100 INJECTION SUBCUTANEOUS at 09:09

## 2024-06-25 RX ADMIN — LEVOTHYROXINE SODIUM 25 MCG: 25 TABLET ORAL at 06:03

## 2024-06-25 RX ADMIN — PANTOPRAZOLE SODIUM 20 MG: 20 TABLET, DELAYED RELEASE ORAL at 06:03

## 2024-06-25 ASSESSMENT — PULMONARY FUNCTION TESTS
PIF_VALUE: 0

## 2024-06-25 ASSESSMENT — PAIN SCALES - GENERAL: PAINLEVEL_OUTOF10: 0

## 2024-06-25 NOTE — PROGRESS NOTES
Inpatient Progress Note 6/25/2024        Kiersten Bennett  1950  [id number]      Assessment/Plan:    Kiersten Bennett is a 74 y.o. female with a history of hypertension, hyperlipidemia, hypothyroidism, GERD, RLS and right-sided Bell's palsy who presented to [facility] 6/21/2024 strokelike symptoms.  Admitted to ICU 6/24/2024 left internal carotid artery angiogram with stent     Problem list    Acute CVA  Stenosis of left ICA  Hypertension  Hx hypothyroidism  Current smoker    Neuro: Status post left internal carotid artery angiogram with stent.  MRI brain punctate foci of restricted diffusion within the cortex of left lateral partial lobe, and left MCA stenosis.  Continue Plavix and ASA.  Doing home antihypertensives neurochecks per protocol.  Interventional radiology following.  Repeat head CT in a.m.  Cardio: Hemodynamically stable.  Maintain SBP <140  Resp: No acute concerns  GI: PPI for PPx.  Advance diet as tolerated  : Stable, follow renal panel, replace electrolytes as needed.  Heme: Stable.  No signs of bleeding.  Hgb 14.0.  Transfuse for Hgb <7  ID: Does not appear to be infectious.  Monitor off of antibiotics  Endo: History hypothyroidism, continue Synthroid  MSK: PT OT as able    Stable. Ok to transfer out of ICU to hospital medical service    Tubes/Lines/Drains:    PIV  Code Status:   Full code    Subjective:    Patient seen and examined at bedside.  Sitting up on side of bed.  Vital signs stable.  No overnight events.  Denies headache.  No other concerns voiced.  Has ambulated in room without difficulty.      Review of Systems   All other systems reviewed and are negative.       Physical Exam:            BP (!) 119/52   Pulse 68   Temp 98.4 °F (36.9 °C) (Oral)   Resp 22   Ht 1.6 m (5' 3\")   Wt 74.2 kg (163 lb 9.3 oz)   SpO2 96%   BMI 28.98 kg/m²     General: NAD  Eyes: EOMI  ENT: neck supple  Cardiovascular: Regular rate.  Respiratory: Clear to auscultation  Gastrointestinal: Soft, non

## 2024-06-25 NOTE — PROGRESS NOTES
Discharge instructions given, all questions answered. Restriction directions given regarding groin sites. All belongings went home with patient and medications from outpt pharmacy.

## 2024-06-25 NOTE — PROGRESS NOTES
Outpatient Pharmacy Progress Note for Meds-to-Beds    Total number of Prescriptions Filled: 1    Additional Documentation:  Medication(s) were delivered to the patient's room prior to discharge      Thank you for letting us serve your patients.  [address] [address]    Phone: [phone]    Fax: [phone]

## 2024-06-25 NOTE — DISCHARGE INSTRUCTIONS
Continue aspirin and Plavix.  Follow-up with Dr. Garcia in 2 weeks.  May shower, no soaking of incision for 7 days.  No lifting more than 10 pounds for 7 days.  No driving for 24 hours.  You are being discharged only on amlodipine; do not restart other blood pressure medicines until seen by neurology/neuro intervention. Your systolic blood pressure needs to be ideally at 120-140 mm hg

## 2024-06-25 NOTE — TELEPHONE ENCOUNTER
----- Message from MEGAN Navarro - CNP sent at 6/25/2024 10:42 AM EDT -----  Hello,     Can you please schedule her for a 2 week groin check and then a 3 month follow up on my schedule. Thanks, Jessika

## 2024-06-25 NOTE — PROGRESS NOTES
Vascular/Interventional Neurology Progress Note  [facility]  Patient Name: Kiersten Bennett     : 1950      Subjective:     The patient was seen and examined.  Chart reviewed.  No acute events documented overnight.  The patient denies any new neurological changes.  Right groin site soft without hematoma.  She is been up and ambulated to the bathroom.  She is a little anxious about going home.  She has quit smoking since this admission and is nervous about starting again.  Her goal is to quit smoking.  We discussed postprocedure groin care and follow-up in the office in 2 weeks.  She denies further questions.  Her  was present at bedside      Objective:   Scheduled Meds:   sodium chloride flush  5-40 mL IntraVENous 2 times per day    amLODIPine  10 mg Oral Daily    levothyroxine  25 mcg Oral Daily    [Held by provider] lisinopril-hydroCHLOROthiazide  1 tablet Oral Daily    [Held by provider] metoprolol tartrate  50 mg Oral BID    pantoprazole  20 mg Oral QAM AC    rOPINIRole  1 mg Oral TID    rosuvastatin  20 mg Oral Nightly    clopidogrel  75 mg Oral Daily    polyethylene glycol  17 g Oral Daily    enoxaparin  40 mg SubCUTAneous Daily    aspirin  81 mg Oral Daily     Continuous Infusions:   sodium chloride      niCARdipine      phenylephrine       PRN Meds:.sodium chloride flush, sodium chloride, acetaminophen, ondansetron **OR** ondansetron, labetalol, niCARdipine, hydrALAZINE, phenylephrine, traZODone, sennosides-docusate sodium, melatonin    Vital Signs:  Vitals:    24 0530 24 0600 24 0700 24 0729   BP: (!) 142/50 (!) 130/51 (!) 100/47    Pulse: 56 60 71 72   Resp: 16 16 17 21   Temp:       TempSrc:       SpO2:    96%   Weight:       Height:            General: A&O x 4, NAD, cooperative  HEENT: NC/AT, EOMI, PERRL, mmm, neck supple  Extremities: Left groin site soft without hematoma    Neurological Exam:         Mental Status:  A&O to self, location, and  year. NAD, speech clear, language fluent, repetition and naming intact, follows commands appropriately     Cranial Nerves:  CN II-XII intact     Sensation: intact LT/temp UE/LE's b/l     Motor: 5/5 UE/LE's b/l, tone and bulk normal, no pronator drift     Reflexes: 2/4 biceps, triceps, brachioradialis, patellar, and achilles bilaterally; flexor plantar responses bilaterally     Coordination:       Tremors-- None       Rapidly alternating movements (AJITH): No dysdiadonchokinesis b/l      Heel-Shin: No dysmetria b/l      Finger-Nose: No dysmetria b/l     Gait/Station: Deferred    Labs:   No results for input(s): \"WBC\", \"NA\", \"K\", \"CL\", \"CO2\", \"BUN\", \"CREATININE\", \"GLUCOSE\", \"INR\", \"CPK\", \"CKMB\" in the last 72 hours.    Invalid input(s): \"HEMOGLOBIN\", \"HEMATOCRIT\", \"PLATELETS\", \"CA\", \"PT\", \"PTT\", \"CK1\", \"TROP\", \"BNAP\",Last TSH Results[unfilled],Last Free T4 Results[unfilled],[unfilled],[unfilled]  Last Liver Function Results:  @LABRCNTIP(ALT:3,AST:3,BILITOTAL:3,BILIDIR:3,ALKPHOS:3)@     Imaging Studies:     CT head 6/21  FINDINGS: No acute intracranial CT findings. Microvascular ischemia. No areas of  abnormal attenuation are seen in the brain. There is no CT evidence of acute  hemorrhage or infarction. The ventricles are normal in size. There are no  extra-axial fluid collections. No masses are seen. The sinuses are clear.      MULTIFOCAL SCLEROTIC LESIONS THROUGHOUT THE VISUALIZED CRANIUM. CORRELATE FOR  METASTATIC DISEASE.     CTA head and neck 6/21/24  IMPRESSION:     1.  Focal severe stenosis/occlusion of the left M2 segment with distal  reconstitution of the remainder of the M2 and M3 segments.  2.  Severe calcified and noncalcified plaque at the left bifurcation and  cervical ICA with up to 90% stenosis by NASCET criteria.  3.  Mild atherosclerotic calcification of the right bifurcation with up to 20%  stenosis by NASCET criteria.  4.  Scattered atherosclerotic calcification otherwise as described above.  5.  Severe  emphysema within the imaged lung apices.     COMMUNICATION:  Communicated with: Dr. Chadwick on  6/21/2024 at 9:45 p.m.     MRI brain 6/22/24  IMPRESSION:  1. Punctate foci of restricted diffusion are demonstrated within the cortex of  the left lateral parietal lobe and the superior left cortical parietal lobe in a  patient with known history of left MCA stenosis.. Please see CTA of the head and  neck performed 21 June 2024.  2. Chronic small vessel ischemic disease.     Cerebral angiogram  Summary:      Successful angioplasty and stenting of symptomatic left internal carotid artery stenosis with distal protection device  .  No significant intracranial stenosis     40% stenosis of the prevertebral right subclavian artery     Occluded right iliac artery     Electronically signed by Silvia Garcia DO on 6/24/2024 at 3:23 PM        Assessment/Plan:      74-year-old female s/p angioplasty and stenting of symptomatic left ICA.    -Neuroimaging as above  -Cerebral angiogram: Successful angioplasty and stenting of symptomatic left internal carotid artery stenosis with distal protection device.  No significant intracranial stenosis.  40% stenosis of the prevertebral right subclavian artery.  Occluded right iliac artery  -Continue aspirin and Plavix  -Continue statin for secondary prevention of stroke  -Okay to discharge home from NI standpoint.  Follow-up outpatient in Dr. Garcia's office in 2 weeks.     Pertinent images discussed/reviewed with Dr. Garcia who has fully participated in the care of this patient and agrees with plan.     Electronically signed by MEGAN Navarro CNP on 6/25/2024 at 8:12 AM   6/25/2024

## 2024-06-25 NOTE — PLAN OF CARE
Problem: Discharge Planning  Goal: Discharge to home or other facility with appropriate resources  6/25/2024 1512 by Kavita Pagan RN  Outcome: Completed  6/25/2024 0424 by Rosario Burk RN  Outcome: Progressing     Problem: Pain  Goal: Verbalizes/displays adequate comfort level or baseline comfort level  6/25/2024 1512 by Kavita Pagan RN  Outcome: Completed  6/25/2024 0424 by Rosario Burk RN  Outcome: Progressing     Problem: Chronic Conditions and Co-morbidities  Goal: Patient's chronic conditions and co-morbidity symptoms are monitored and maintained or improved  6/25/2024 1512 by Kavita Pagan RN  Outcome: Completed  6/25/2024 0424 by Rosario Burk RN  Outcome: Progressing     Problem: Safety - Adult  Goal: Free from fall injury  6/25/2024 1512 by Kavita Pagan RN  Outcome: Completed  6/25/2024 0424 by Rosario Burk RN  Outcome: Progressing

## 2024-06-25 NOTE — PROGRESS NOTES
Neurology Service Progress Note  [facility]   Patient Name: Kiersten Bennett  : 1950        Subjective:   Reason for consult: Stroke like symptoms  Chart was reviewed in detail, patient was seen and assessed.  She is S/P angiogram with stent placement. She is sitting up in chair with family at bedside. She states she is doing well but would like to stay one more night.       History reviewed. No pertinent past medical history. :   History reviewed. No pertinent surgical history.  Medications:  Scheduled Meds:   sodium chloride flush  5-40 mL IntraVENous 2 times per day    amLODIPine  10 mg Oral Daily    levothyroxine  25 mcg Oral Daily    [Held by provider] lisinopril-hydroCHLOROthiazide  1 tablet Oral Daily    [Held by provider] metoprolol tartrate  50 mg Oral BID    pantoprazole  20 mg Oral QAM AC    rOPINIRole  1 mg Oral TID    rosuvastatin  20 mg Oral Nightly    clopidogrel  75 mg Oral Daily    polyethylene glycol  17 g Oral Daily    enoxaparin  40 mg SubCUTAneous Daily    aspirin  81 mg Oral Daily     Continuous Infusions:   sodium chloride      niCARdipine      phenylephrine       PRN Meds:.sodium chloride flush, sodium chloride, acetaminophen, ondansetron **OR** ondansetron, labetalol, niCARdipine, hydrALAZINE, phenylephrine, traZODone, sennosides-docusate sodium, melatonin    No Known Allergies  Social History     Socioeconomic History    Marital status:      Spouse name: Not on file    Number of children: Not on file    Years of education: Not on file    Highest education level: Not on file   Occupational History    Not on file   Tobacco Use    Smoking status: Every Day     Current packs/day: 2.00     Types: Cigarettes    Smokeless tobacco: Never   Substance and Sexual Activity    Alcohol use: Never    Drug use: Never    Sexual activity: Not on file   Other Topics Concern    Not on file   Social History Narrative    Not on file     Social Determinants of Health  known history of left MCA stenosis.. Please see CTA of the head and  neck performed 21 June 2024.  2. Chronic small vessel ischemic disease.    All imaging was personally reviewed    ASSESSMENT/PLAN:   Patient doing well overall, no recurrence of symptoms since admission.  Does feel right face is slightly more weak than her usual baseline however does have residual right facial weakness secondary to history of Bell's palsy.  Otherwise no focal findings on exam today.  Episodes of aphasia, right upper extremity numbness and weakness secondary to acute stroke superimposed on symptomatic left ICA stenosis  Neuroimaging as above  CT head with no acute findings but does note multiple sclerotic lesions throughout the visualized cranium.  Would consider CT chest, abdomen, pelvis to rule out any underlying malignancy.  Patient does have history of tobacco use, currently smokes  CTA head and neck with focal severe stenosis/occlusion of the left M2 segment MCA and severe left ICA stenosis  She will remain on DAPT with ASA and Plavix as well as statin therapy  Continue home dose Crestor 20 mg  LDL 44, A1c 6.2  Would avoid hypotension to ensure patient is well-perfused.  SBP goal 120-140  Antihypertensives currently on hold per IM  PT/OT/ST as recommended  Plans to follow up with Dr. Garcia as outpatient  We will put Dr. Mcmillan's information on discharge paperwork for  management of her RLS.   No further neurological testing is warranted at this time.  We will sign off.  Call us if needed.  Thank you    Patient was discussed with attending neurologist Dr. Landeros    > 30 minutes of time spent included chart review, obtaining history, patient examination, developing plan of care, and documentation.      Thank you for allowing us to participate in the care of your patient.  If there are any questions regarding evaluation please feel free to contact us.

## 2024-06-25 NOTE — DISCHARGE SUMMARY
Discharge Summary    Name:  Kiersten Bennett /Age/Sex: 1950  (74 y.o. female)   MRN & CSN:  [id number] & [id number] Admission Date/Time: 2024  8:17 PM   Attending:  Ayla Blackwood MD Discharging Physician: Alya Blackwood MD       Discharge Diagnosis:  Acute CVA  Symptomatic left ICA stenosis  Hypertension  Hypothyroidism  GERD  RLS      Discharge Exam  Physical Exam  Vitals:    24 0800 24 0900 24 1300 24 1400   BP: (!) 100/47 (!) 119/52 107/64 114/66   Pulse: 61 68 71 73   Resp: 27 22 21 20   Temp: 97.5 °F (36.4 °C)      TempSrc: Oral      SpO2: 97%      Weight:       Height:            General: NAD  Eyes: EOMI  ENT: neck supple  Cardiovascular: Regular rate.  Respiratory: Clear to auscultation  Gastrointestinal: Soft, non tender  Genitourinary: no suprapubic tenderness  Musculoskeletal: No edema  Skin: warm, dry  Neuro: Alert, oriented, no focal neurology deficit  Psych: Mood appropriate.   Hospital Course:   Kiersten Bennett is a 74 y.o.  female  who presents with Stroke-like symptoms    Acute CVA  Stenosis of left ICA  -MRI Brain: Punctate foci of restricted diffusion are demonstrated within the cortex of the left lateral parietal lobe and the superior left cortical parietal lobe in a patient with known history of left MCA stenosis.. Please see CTA of the head and neck performed 2024.  -Neuro IR Consulted: s/p  Angiogram with likely carotid artery stent placement today Plavix 300 mg load x 1 today, then continue Plavix 75 mg PO qd thereafter for 3 months   -Appreciate neurology input agreed DAPT for secondary stroke prevention.   -Continue Crestor     Hypertension  -Patient on amlodipine, lisinopril hydrochlorothiazide combination at home.  -Plan to keep - 140 mmHg.  Amlodipine has been resumed.  Continue to hold lisinopril hydrochlorothiazide upon discharge until seen by neurointervention outpatient.     Hypothyroidism  -Continue home Synthroid

## 2024-06-25 NOTE — CARE COORDINATION
Spoke with pt in room. From home with . Pt lives in [place] most of the year but comes back to [place] for a few months to see family. Pt's granddaughter is having a baby, so pt plans to stay longer. States all her doctors are normally in [place], but she has contacts here in case she needs to see someone. Denies needs. Confirms she is independent of her ADLs and has good insurance.   CM available if needed.

## 2024-06-26 NOTE — TELEPHONE ENCOUNTER
Spoke to patient. Nurse visit scheduled on Monday, 7/8/24 @ 10 am. Detailed instructions to our office given to patient - agreeable and verbalized understanding.

## 2024-07-08 ENCOUNTER — NURSE ONLY (OUTPATIENT)
Age: 74
End: 2024-07-08

## 2024-07-08 DIAGNOSIS — Z09 ENCOUNTER FOR FOLLOW-UP IN OUTPATIENT CLINIC: Primary | ICD-10-CM

## 2024-07-08 DIAGNOSIS — Z95.820 S/P ANGIOPLASTY WITH STENT: ICD-10-CM

## 2024-07-08 DIAGNOSIS — I65.22 STENOSIS OF LEFT INTERNAL CAROTID ARTERY: ICD-10-CM

## 2024-07-08 NOTE — PATIENT INSTRUCTIONS
Follow up in 3 months with Jessika ARCE.    Continue aspirin and Plavix. You should have enough refills until your next appointment.

## 2024-07-08 NOTE — PROGRESS NOTES
Patient seen in clinic today for a nurse visit for follow up after LICA stent - DOS 6/24/24. Patient denies pain. Right groin access site soft, non-tender, no bruising or swelling noted. Pedal pulses good. Patient is taking ASA/Plavix as prescribed. Patient will follow up with Jessika ARCE in 3 months. Notified patient of appointment date and time. Patient verbalized understanding.

## 2024-07-15 NOTE — PROGRESS NOTES
Physician Progress Note      PATIENT:               JUAN MIGUEL HUNTLEY  CSN #:                  [id number]  :                       1950  ADMIT DATE:       2024 8:17 PM  DISCH DATE:        2024 4:15 PM  RESPONDING  PROVIDER #:        Ayla Beyer MD          QUERY TEXT:    Pt admitted with acute embolic CVA. Pt noted to have symptomatic left ICA   stenosis and MCA stenosis If possible, please document in progress notes and   discharge summary the relationship, if any, between acute CVA and left ICA   and/or left MVA stenosis.    The medical record reflects the following:  Risk Factors: HLD, HTN, smoker,  Clinical Indicators:  neruo consult note \"left MCA territory stroke   secondary to high-grade stenosis of left ICA and left middle cerebral artery\",   DCS \"-MRI Brain: Punctate foci of restricted diffusion are demonstrated   within the cortex of the left lateral parietal lobe and the superior left   cortical parietal lobe in a patient with known history of left MCA stenosis.\"  Treatment: DAPT, cerebral angiogram w angioplasty and stent placed to left   ICA, Crestor, neuro consult, brain MRI, CTA head and neck.    Thank you,  Jessika KILGORE, RN, [phone]  Options provided:  -- Defer to neruorology regarding left MCA territory stroke secondary to   high-grade stenosis of left ICA and left middle cerebral artery  -- Acute embolic CVA due to left ICA and left MCA stenosis.  -- Acute embolic CVA due to left carotid artery.  -- Acute embolic CVA due to left MCA  -- Other - I will add my own diagnosis  -- Disagree - Not applicable / Not valid  -- Disagree - Clinically unable to determine / Unknown  -- Refer to Clinical Documentation Reviewer    PROVIDER RESPONSE TEXT:    Defer to neruorology regarding left MCA territory stroke secondary to   high-grade stenosis of left ICA and left middle cerebral artery    Query created by: Jessika Martin on 2024 11:29 AM      Electronically

## 2024-09-26 ENCOUNTER — HOSPITAL ENCOUNTER (OUTPATIENT)
Age: 74
Discharge: HOME | End: 2024-09-26
Payer: MEDICARE

## 2024-09-26 ENCOUNTER — OFFICE VISIT (OUTPATIENT)
Age: 74
End: 2024-09-26
Payer: MEDICARE

## 2024-09-26 VITALS
SYSTOLIC BLOOD PRESSURE: 172 MMHG | WEIGHT: 163.5 LBS | DIASTOLIC BLOOD PRESSURE: 88 MMHG | BODY MASS INDEX: 28.96 KG/M2 | RESPIRATION RATE: 18 BRPM | OXYGEN SATURATION: 95 % | HEART RATE: 94 BPM

## 2024-09-26 DIAGNOSIS — Z95.820 S/P ANGIOPLASTY WITH STENT: ICD-10-CM

## 2024-09-26 DIAGNOSIS — I65.22 STENOSIS OF LEFT INTERNAL CAROTID ARTERY: Primary | ICD-10-CM

## 2024-09-26 DIAGNOSIS — Z79.02 ENCOUNTER FOR MONITORING ANTIPLATELET THERAPY: ICD-10-CM

## 2024-09-26 DIAGNOSIS — I63.032 CEREBROVASCULAR ACCIDENT (CVA) DUE TO THROMBOSIS OF LEFT CAROTID ARTERY (HCC): ICD-10-CM

## 2024-09-26 DIAGNOSIS — Z51.81 ENCOUNTER FOR MONITORING ANTIPLATELET THERAPY: ICD-10-CM

## 2024-09-26 LAB
PA ADP PRP-ACNC: 190 PRU
PLT FUNCTION ASPIRIN: 482 ARU

## 2024-09-26 PROCEDURE — 36415 COLL VENOUS BLD VENIPUNCTURE: CPT

## 2024-09-26 PROCEDURE — 99213 OFFICE O/P EST LOW 20 MIN: CPT | Performed by: NURSE PRACTITIONER

## 2024-09-26 PROCEDURE — 1123F ACP DISCUSS/DSCN MKR DOCD: CPT | Performed by: NURSE PRACTITIONER

## 2024-09-26 PROCEDURE — 85576 BLOOD PLATELET AGGREGATION: CPT

## 2024-10-01 ENCOUNTER — TELEPHONE (OUTPATIENT)
Age: 74
End: 2024-10-01

## 2024-10-01 NOTE — TELEPHONE ENCOUNTER
----- Message from MEGAN Lincoln CNP sent at 10/1/2024  4:05 PM EDT -----  Hello,    Can you please call and let the patient know that I received her verify now aspirin and P2 Y12.  She responds well to aspirin.  She can discontinue her Plavix and continue aspirin and statin for stroke prevention.    Thank you, Jessika

## 2024-10-02 NOTE — TELEPHONE ENCOUNTER
Patient notified of results and Jessika GUZMAN-CNP recommendations. Patient agreeable and verbalized understanding. Nothing further needed. Plavix d/c'd and taken off medication list.

## 2024-10-02 NOTE — TELEPHONE ENCOUNTER
Number was entered incorrectly in Epic. Patient's number is [phone]. Left message for patient to return call to the office.